# Patient Record
Sex: MALE | Race: ASIAN | NOT HISPANIC OR LATINO | URBAN - METROPOLITAN AREA
[De-identification: names, ages, dates, MRNs, and addresses within clinical notes are randomized per-mention and may not be internally consistent; named-entity substitution may affect disease eponyms.]

---

## 2018-01-01 ENCOUNTER — OUTPATIENT (OUTPATIENT)
Dept: OUTPATIENT SERVICES | Facility: HOSPITAL | Age: 73
LOS: 1 days | End: 2018-01-01

## 2018-01-20 ENCOUNTER — INPATIENT (INPATIENT)
Facility: HOSPITAL | Age: 73
LOS: 3 days | Discharge: HOME HEALTH | End: 2018-01-24
Attending: INTERNAL MEDICINE | Admitting: INTERNAL MEDICINE
Payer: MEDICAID

## 2018-01-20 VITALS
TEMPERATURE: 98 F | RESPIRATION RATE: 16 BRPM | DIASTOLIC BLOOD PRESSURE: 77 MMHG | OXYGEN SATURATION: 100 % | SYSTOLIC BLOOD PRESSURE: 144 MMHG | HEART RATE: 62 BPM

## 2018-01-20 DIAGNOSIS — Z98.49 CATARACT EXTRACTION STATUS, UNSPECIFIED EYE: Chronic | ICD-10-CM

## 2018-01-20 LAB
ALBUMIN SERPL ELPH-MCNC: 4.2 G/DL — SIGNIFICANT CHANGE UP (ref 3.3–5)
ALP SERPL-CCNC: 61 U/L — SIGNIFICANT CHANGE UP (ref 40–120)
ALT FLD-CCNC: 21 U/L — SIGNIFICANT CHANGE UP (ref 4–41)
APTT BLD: 31.3 SEC — SIGNIFICANT CHANGE UP (ref 27.5–37.4)
AST SERPL-CCNC: 31 U/L — SIGNIFICANT CHANGE UP (ref 4–40)
BASE EXCESS BLDV CALC-SCNC: 4.2 MMOL/L — SIGNIFICANT CHANGE UP
BASOPHILS # BLD AUTO: 0.01 K/UL — SIGNIFICANT CHANGE UP (ref 0–0.2)
BASOPHILS NFR BLD AUTO: 0.2 % — SIGNIFICANT CHANGE UP (ref 0–2)
BILIRUB SERPL-MCNC: 0.7 MG/DL — SIGNIFICANT CHANGE UP (ref 0.2–1.2)
BLOOD GAS VENOUS - CREATININE: 1.22 MG/DL — SIGNIFICANT CHANGE UP (ref 0.5–1.3)
BUN SERPL-MCNC: 17 MG/DL — SIGNIFICANT CHANGE UP (ref 7–23)
CALCIUM SERPL-MCNC: 8.5 MG/DL — SIGNIFICANT CHANGE UP (ref 8.4–10.5)
CHLORIDE BLDV-SCNC: 105 MMOL/L — SIGNIFICANT CHANGE UP (ref 96–108)
CHLORIDE SERPL-SCNC: 98 MMOL/L — SIGNIFICANT CHANGE UP (ref 98–107)
CK MB BLD-MCNC: 2.2 — SIGNIFICANT CHANGE UP (ref 0–2.5)
CK MB BLD-MCNC: 3.36 NG/ML — SIGNIFICANT CHANGE UP (ref 1–6.6)
CK SERPL-CCNC: 151 U/L — SIGNIFICANT CHANGE UP (ref 30–200)
CO2 SERPL-SCNC: 25 MMOL/L — SIGNIFICANT CHANGE UP (ref 22–31)
COHGB MFR BLDV: 0.8 % — SIGNIFICANT CHANGE UP (ref 0.5–1.5)
COHGB MFR BLDV: 14.4 G/DL — SIGNIFICANT CHANGE UP (ref 13–17)
CREAT SERPL-MCNC: 1.28 MG/DL — SIGNIFICANT CHANGE UP (ref 0.5–1.3)
EOSINOPHIL # BLD AUTO: 0 K/UL — SIGNIFICANT CHANGE UP (ref 0–0.5)
EOSINOPHIL NFR BLD AUTO: 0 % — SIGNIFICANT CHANGE UP (ref 0–6)
GAS PNL BLDV: 130 MMOL/L — LOW (ref 136–146)
GLUCOSE BLDV-MCNC: 178 — HIGH (ref 70–99)
GLUCOSE SERPL-MCNC: 164 MG/DL — HIGH (ref 70–99)
HCO3 BLDV-SCNC: 26 MMOL/L — SIGNIFICANT CHANGE UP (ref 20–27)
HCT VFR BLD CALC: 43.2 % — SIGNIFICANT CHANGE UP (ref 39–50)
HCT VFR BLDV CALC: 44.1 % — SIGNIFICANT CHANGE UP (ref 39–51)
HGB BLD-MCNC: 14.1 G/DL — SIGNIFICANT CHANGE UP (ref 13–17)
HGB BLDV-MCNC: 14.4 G/DL — SIGNIFICANT CHANGE UP (ref 13–17)
IMM GRANULOCYTES # BLD AUTO: 0.03 # — SIGNIFICANT CHANGE UP
IMM GRANULOCYTES NFR BLD AUTO: 0.5 % — SIGNIFICANT CHANGE UP (ref 0–1.5)
INR BLD: 1.05 — SIGNIFICANT CHANGE UP (ref 0.88–1.17)
LACTATE BLDV-MCNC: 1.6 MMOL/L — SIGNIFICANT CHANGE UP (ref 0.5–2)
LIDOCAIN IGE QN: 33.1 U/L — SIGNIFICANT CHANGE UP (ref 7–60)
LYMPHOCYTES # BLD AUTO: 0.62 K/UL — LOW (ref 1–3.3)
LYMPHOCYTES # BLD AUTO: 10.2 % — LOW (ref 13–44)
MCHC RBC-ENTMCNC: 28.3 PG — SIGNIFICANT CHANGE UP (ref 27–34)
MCHC RBC-ENTMCNC: 32.6 % — SIGNIFICANT CHANGE UP (ref 32–36)
MCV RBC AUTO: 86.6 FL — SIGNIFICANT CHANGE UP (ref 80–100)
METHGB MFR BLDV: 0.6 % — SIGNIFICANT CHANGE UP (ref 0–1.5)
MONOCYTES # BLD AUTO: 0.26 K/UL — SIGNIFICANT CHANGE UP (ref 0–0.9)
MONOCYTES NFR BLD AUTO: 4.3 % — SIGNIFICANT CHANGE UP (ref 2–14)
NEUTROPHILS # BLD AUTO: 5.14 K/UL — SIGNIFICANT CHANGE UP (ref 1.8–7.4)
NEUTROPHILS NFR BLD AUTO: 84.8 % — HIGH (ref 43–77)
NRBC # FLD: 0 — SIGNIFICANT CHANGE UP
OXYHGB MFR BLDV: 30 % — LOW (ref 59–84)
PCO2 BLDV: 53 MMHG — HIGH (ref 41–51)
PH BLDV: 7.36 PH — SIGNIFICANT CHANGE UP (ref 7.32–7.43)
PLATELET # BLD AUTO: 178 K/UL — SIGNIFICANT CHANGE UP (ref 150–400)
PMV BLD: 11.7 FL — SIGNIFICANT CHANGE UP (ref 7–13)
PO2 BLDV: < 24 MMHG — LOW (ref 35–40)
POTASSIUM BLDV-SCNC: 4 MMOL/L — SIGNIFICANT CHANGE UP (ref 3.4–4.5)
POTASSIUM SERPL-MCNC: 4.9 MMOL/L — SIGNIFICANT CHANGE UP (ref 3.5–5.3)
POTASSIUM SERPL-SCNC: 4.9 MMOL/L — SIGNIFICANT CHANGE UP (ref 3.5–5.3)
PROT SERPL-MCNC: 7.3 G/DL — SIGNIFICANT CHANGE UP (ref 6–8.3)
PROTHROM AB SERPL-ACNC: 12.1 SEC — SIGNIFICANT CHANGE UP (ref 9.8–13.1)
RBC # BLD: 4.99 M/UL — SIGNIFICANT CHANGE UP (ref 4.2–5.8)
RBC # FLD: 15.5 % — HIGH (ref 10.3–14.5)
SAO2 % BLDV: 30.4 % — LOW (ref 60–85)
SODIUM SERPL-SCNC: 136 MMOL/L — SIGNIFICANT CHANGE UP (ref 135–145)
TROPONIN T SERPL-MCNC: < 0.06 NG/ML — SIGNIFICANT CHANGE UP (ref 0–0.06)
WBC # BLD: 6.06 K/UL — SIGNIFICANT CHANGE UP (ref 3.8–10.5)
WBC # FLD AUTO: 6.06 K/UL — SIGNIFICANT CHANGE UP (ref 3.8–10.5)

## 2018-01-20 PROCEDURE — 74177 CT ABD & PELVIS W/CONTRAST: CPT | Mod: 26

## 2018-01-20 PROCEDURE — 71046 X-RAY EXAM CHEST 2 VIEWS: CPT | Mod: 26

## 2018-01-20 RX ORDER — ASPIRIN/CALCIUM CARB/MAGNESIUM 324 MG
162 TABLET ORAL ONCE
Qty: 0 | Refills: 0 | Status: COMPLETED | OUTPATIENT
Start: 2018-01-20 | End: 2018-01-20

## 2018-01-20 RX ORDER — ONDANSETRON 8 MG/1
4 TABLET, FILM COATED ORAL ONCE
Qty: 0 | Refills: 0 | Status: COMPLETED | OUTPATIENT
Start: 2018-01-20 | End: 2018-01-20

## 2018-01-20 RX ORDER — SODIUM CHLORIDE 9 MG/ML
500 INJECTION INTRAMUSCULAR; INTRAVENOUS; SUBCUTANEOUS ONCE
Qty: 0 | Refills: 0 | Status: COMPLETED | OUTPATIENT
Start: 2018-01-20 | End: 2018-01-20

## 2018-01-20 RX ADMIN — ONDANSETRON 4 MILLIGRAM(S): 8 TABLET, FILM COATED ORAL at 21:06

## 2018-01-20 RX ADMIN — Medication 162 MILLIGRAM(S): at 21:06

## 2018-01-20 RX ADMIN — SODIUM CHLORIDE 500 MILLILITER(S): 9 INJECTION INTRAMUSCULAR; INTRAVENOUS; SUBCUTANEOUS at 21:06

## 2018-01-20 NOTE — ED ADULT NURSE NOTE - OBJECTIVE STATEMENT
Celestino RN:. 72 Male received in spot A.  Pt is primarily Thai speaking,  translation service offered prefer son to translate.  Pt co SOB beginning this morning.  Son states, that Fire Dept was called for possible carbon monoxide exposure.  As per son there was co2 readings in the house and it was evacuated.  no other residents with symptoms.  Also co nausea, vomiting x7 episodes.  denies blood in emesis.   As per wife increase malaise since this am.  Denies cough but states subjective fevers at home. 18G L AC.

## 2018-01-20 NOTE — ED PROVIDER NOTE - ATTENDING CONTRIBUTION TO CARE
Dr. Cespedes:  I have personally performed a face to face bedside history and physical examination of this patient. I have discussed the history, examination, review of systems, assessment and plan of management with the resident. I have reviewed the electronic medical record and amended it to reflect my history, review of systems, physical exam, assessment and plan.    72M h/o HTN, DM, CVA, presents with nausea/vomiting, malaise, diaphoresis x 1 day.  Of note, son states that their building warned them of possible carbon monoxide exposure this evening.   Denies fever/chills, cp, neuro deficits.  +Mild headache.    Exam:  - nad  - rrr  - ctab  - abd soft, mildly ttp llq  - no focal neuro deficits    A/P  - ddx: carbon monoxide exposure, ACS, pancreatitis, diverticulitis  - cbc, cmp, trop, vbg, co-ox, lipase  - ekg  - cxr  - CT abd/pelvis  - ua

## 2018-01-20 NOTE — ED PROVIDER NOTE - OBJECTIVE STATEMENT
72M h/o CVA, DM, HTN, HLD Occitan speaking presenting with SOB. Notes symptoms starting this morning with nausea, vomiting x7, SOB, malaise, worse with exertion. Son also notes that Fire Dept was called for possible carbon monoxide exposure. Had cataract surgery yesterday on R eye.

## 2018-01-20 NOTE — ED ADULT NURSE NOTE - CHIEF COMPLAINT QUOTE
brought in by EMS from home for c/o nausea/vomiting with diaphoresis and SOB that started 9am. JY=233. Hx DM, HTN, CVA, HLD

## 2018-01-20 NOTE — ED ADULT TRIAGE NOTE - CHIEF COMPLAINT QUOTE
brought in by EMS from home for c/o nausea/vomiting with diaphoresis and SOB that started 9am. YP=[id number]. Hx DM, HTN, CVA, HLD

## 2018-01-20 NOTE — ED PROVIDER NOTE - MEDICAL DECISION MAKING DETAILS
72M p/w above history p/w nausea, vomiting, shortness of breath, LLQ tenderness on exam, concerning for r/o ACS vs diverticulitis vs UTI/pyelo  -labs, ekg, xr, CT

## 2018-01-21 DIAGNOSIS — I10 ESSENTIAL (PRIMARY) HYPERTENSION: ICD-10-CM

## 2018-01-21 DIAGNOSIS — H26.9 UNSPECIFIED CATARACT: ICD-10-CM

## 2018-01-21 DIAGNOSIS — J45.909 UNSPECIFIED ASTHMA, UNCOMPLICATED: ICD-10-CM

## 2018-01-21 DIAGNOSIS — E11.9 TYPE 2 DIABETES MELLITUS WITHOUT COMPLICATIONS: ICD-10-CM

## 2018-01-21 DIAGNOSIS — R06.02 SHORTNESS OF BREATH: ICD-10-CM

## 2018-01-21 DIAGNOSIS — Z29.9 ENCOUNTER FOR PROPHYLACTIC MEASURES, UNSPECIFIED: ICD-10-CM

## 2018-01-21 DIAGNOSIS — N40.0 BENIGN PROSTATIC HYPERPLASIA WITHOUT LOWER URINARY TRACT SYMPTOMS: ICD-10-CM

## 2018-01-21 DIAGNOSIS — E03.9 HYPOTHYROIDISM, UNSPECIFIED: ICD-10-CM

## 2018-01-21 DIAGNOSIS — R11.2 NAUSEA WITH VOMITING, UNSPECIFIED: ICD-10-CM

## 2018-01-21 DIAGNOSIS — K21.9 GASTRO-ESOPHAGEAL REFLUX DISEASE WITHOUT ESOPHAGITIS: ICD-10-CM

## 2018-01-21 DIAGNOSIS — E78.5 HYPERLIPIDEMIA, UNSPECIFIED: ICD-10-CM

## 2018-01-21 LAB
APPEARANCE UR: CLEAR — SIGNIFICANT CHANGE UP
BILIRUB UR-MCNC: NEGATIVE — SIGNIFICANT CHANGE UP
BLOOD UR QL VISUAL: NEGATIVE — SIGNIFICANT CHANGE UP
BUN SERPL-MCNC: 12 MG/DL — SIGNIFICANT CHANGE UP (ref 7–23)
CALCIUM SERPL-MCNC: 8.8 MG/DL — SIGNIFICANT CHANGE UP (ref 8.4–10.5)
CHLORIDE SERPL-SCNC: 101 MMOL/L — SIGNIFICANT CHANGE UP (ref 98–107)
CHOLEST SERPL-MCNC: 117 MG/DL — LOW (ref 120–199)
CK MB BLD-MCNC: 3.2 — HIGH (ref 0–2.5)
CK MB BLD-MCNC: 5.21 NG/ML — SIGNIFICANT CHANGE UP (ref 1–6.6)
CK MB BLD-MCNC: 5.54 NG/ML — SIGNIFICANT CHANGE UP (ref 1–6.6)
CK SERPL-CCNC: 135 U/L — SIGNIFICANT CHANGE UP (ref 30–200)
CK SERPL-CCNC: 161 U/L — SIGNIFICANT CHANGE UP (ref 30–200)
CO2 SERPL-SCNC: 25 MMOL/L — SIGNIFICANT CHANGE UP (ref 22–31)
COLOR SPEC: SIGNIFICANT CHANGE UP
CREAT SERPL-MCNC: 1.23 MG/DL — SIGNIFICANT CHANGE UP (ref 0.5–1.3)
GLUCOSE SERPL-MCNC: 95 MG/DL — SIGNIFICANT CHANGE UP (ref 70–99)
GLUCOSE UR-MCNC: NEGATIVE — SIGNIFICANT CHANGE UP
HBA1C BLD-MCNC: 6.9 % — HIGH (ref 4–5.6)
HCT VFR BLD CALC: 41.2 % — SIGNIFICANT CHANGE UP (ref 39–50)
HDLC SERPL-MCNC: 65 MG/DL — HIGH (ref 35–55)
HGB BLD-MCNC: 13.6 G/DL — SIGNIFICANT CHANGE UP (ref 13–17)
KETONES UR-MCNC: NEGATIVE — SIGNIFICANT CHANGE UP
LEUKOCYTE ESTERASE UR-ACNC: NEGATIVE — SIGNIFICANT CHANGE UP
LIPID PNL WITH DIRECT LDL SERPL: 46 MG/DL — SIGNIFICANT CHANGE UP
MAGNESIUM SERPL-MCNC: 2 MG/DL — SIGNIFICANT CHANGE UP (ref 1.6–2.6)
MCHC RBC-ENTMCNC: 28.3 PG — SIGNIFICANT CHANGE UP (ref 27–34)
MCHC RBC-ENTMCNC: 33 % — SIGNIFICANT CHANGE UP (ref 32–36)
MCV RBC AUTO: 85.8 FL — SIGNIFICANT CHANGE UP (ref 80–100)
MUCOUS THREADS # UR AUTO: SIGNIFICANT CHANGE UP
NITRITE UR-MCNC: NEGATIVE — SIGNIFICANT CHANGE UP
NRBC # FLD: 0 — SIGNIFICANT CHANGE UP
NT-PROBNP SERPL-SCNC: 289.8 PG/ML — SIGNIFICANT CHANGE UP
PH UR: 6 — SIGNIFICANT CHANGE UP (ref 4.6–8)
PLATELET # BLD AUTO: 174 K/UL — SIGNIFICANT CHANGE UP (ref 150–400)
PMV BLD: 11.5 FL — SIGNIFICANT CHANGE UP (ref 7–13)
POTASSIUM SERPL-MCNC: 4 MMOL/L — SIGNIFICANT CHANGE UP (ref 3.5–5.3)
POTASSIUM SERPL-SCNC: 4 MMOL/L — SIGNIFICANT CHANGE UP (ref 3.5–5.3)
PROT UR-MCNC: NEGATIVE MG/DL — SIGNIFICANT CHANGE UP
RBC # BLD: 4.8 M/UL — SIGNIFICANT CHANGE UP (ref 4.2–5.8)
RBC # FLD: 15.2 % — HIGH (ref 10.3–14.5)
RBC CASTS # UR COMP ASSIST: SIGNIFICANT CHANGE UP (ref 0–?)
SODIUM SERPL-SCNC: 138 MMOL/L — SIGNIFICANT CHANGE UP (ref 135–145)
SP GR SPEC: 1.01 — SIGNIFICANT CHANGE UP (ref 1–1.04)
SQUAMOUS # UR AUTO: SIGNIFICANT CHANGE UP
TRIGL SERPL-MCNC: 48 MG/DL — SIGNIFICANT CHANGE UP (ref 10–149)
TROPONIN T SERPL-MCNC: < 0.06 NG/ML — SIGNIFICANT CHANGE UP (ref 0–0.06)
TROPONIN T SERPL-MCNC: < 0.06 NG/ML — SIGNIFICANT CHANGE UP (ref 0–0.06)
TSH SERPL-MCNC: 2.81 UIU/ML — SIGNIFICANT CHANGE UP (ref 0.27–4.2)
UROBILINOGEN FLD QL: NORMAL MG/DL — SIGNIFICANT CHANGE UP
WBC # BLD: 4.89 K/UL — SIGNIFICANT CHANGE UP (ref 3.8–10.5)
WBC # FLD AUTO: 4.89 K/UL — SIGNIFICANT CHANGE UP (ref 3.8–10.5)
WBC UR QL: SIGNIFICANT CHANGE UP (ref 0–?)

## 2018-01-21 PROCEDURE — 93010 ELECTROCARDIOGRAM REPORT: CPT

## 2018-01-21 RX ORDER — ACETAMINOPHEN 500 MG
650 TABLET ORAL EVERY 6 HOURS
Qty: 0 | Refills: 0 | Status: DISCONTINUED | OUTPATIENT
Start: 2018-01-21 | End: 2018-01-21

## 2018-01-21 RX ORDER — KETOROLAC TROMETHAMINE 0.5 %
1 DROPS OPHTHALMIC (EYE)
Qty: 0 | Refills: 0 | COMMUNITY

## 2018-01-21 RX ORDER — BUDESONIDE AND FORMOTEROL FUMARATE DIHYDRATE 160; 4.5 UG/1; UG/1
2 AEROSOL RESPIRATORY (INHALATION)
Qty: 0 | Refills: 0 | Status: DISCONTINUED | OUTPATIENT
Start: 2018-01-21 | End: 2018-01-24

## 2018-01-21 RX ORDER — HEPARIN SODIUM 5000 [USP'U]/ML
5000 INJECTION INTRAVENOUS; SUBCUTANEOUS EVERY 12 HOURS
Qty: 0 | Refills: 0 | Status: DISCONTINUED | OUTPATIENT
Start: 2018-01-21 | End: 2018-01-24

## 2018-01-21 RX ORDER — TAMSULOSIN HYDROCHLORIDE 0.4 MG/1
0.4 CAPSULE ORAL AT BEDTIME
Qty: 0 | Refills: 0 | Status: DISCONTINUED | OUTPATIENT
Start: 2018-01-21 | End: 2018-01-24

## 2018-01-21 RX ORDER — ONDANSETRON 8 MG/1
4 TABLET, FILM COATED ORAL ONCE
Qty: 0 | Refills: 0 | Status: COMPLETED | OUTPATIENT
Start: 2018-01-21 | End: 2018-01-21

## 2018-01-21 RX ORDER — DEXTROSE 50 % IN WATER 50 %
12.5 SYRINGE (ML) INTRAVENOUS ONCE
Qty: 0 | Refills: 0 | Status: DISCONTINUED | OUTPATIENT
Start: 2018-01-21 | End: 2018-01-24

## 2018-01-21 RX ORDER — PIOGLITAZONE HYDROCHLORIDE 15 MG/1
1 TABLET ORAL
Qty: 0 | Refills: 0 | COMMUNITY

## 2018-01-21 RX ORDER — BUDESONIDE AND FORMOTEROL FUMARATE DIHYDRATE 160; 4.5 UG/1; UG/1
2 AEROSOL RESPIRATORY (INHALATION)
Qty: 0 | Refills: 0 | COMMUNITY

## 2018-01-21 RX ORDER — FOLIC ACID 0.8 MG
1 TABLET ORAL DAILY
Qty: 0 | Refills: 0 | Status: DISCONTINUED | OUTPATIENT
Start: 2018-01-21 | End: 2018-01-24

## 2018-01-21 RX ORDER — TAMSULOSIN HYDROCHLORIDE 0.4 MG/1
1 CAPSULE ORAL
Qty: 0 | Refills: 0 | COMMUNITY

## 2018-01-21 RX ORDER — ALBUTEROL 90 UG/1
2 AEROSOL, METERED ORAL EVERY 6 HOURS
Qty: 0 | Refills: 0 | Status: DISCONTINUED | OUTPATIENT
Start: 2018-01-21 | End: 2018-01-24

## 2018-01-21 RX ORDER — AMLODIPINE BESYLATE 2.5 MG/1
2.5 TABLET ORAL DAILY
Qty: 0 | Refills: 0 | Status: DISCONTINUED | OUTPATIENT
Start: 2018-01-21 | End: 2018-01-24

## 2018-01-21 RX ORDER — LOSARTAN POTASSIUM 100 MG/1
1 TABLET, FILM COATED ORAL
Qty: 0 | Refills: 0 | COMMUNITY

## 2018-01-21 RX ORDER — ACETAMINOPHEN 500 MG
650 TABLET ORAL EVERY 6 HOURS
Qty: 0 | Refills: 0 | Status: DISCONTINUED | OUTPATIENT
Start: 2018-01-21 | End: 2018-01-24

## 2018-01-21 RX ORDER — PANTOPRAZOLE SODIUM 20 MG/1
40 TABLET, DELAYED RELEASE ORAL
Qty: 0 | Refills: 0 | Status: DISCONTINUED | OUTPATIENT
Start: 2018-01-21 | End: 2018-01-24

## 2018-01-21 RX ORDER — GLUCAGON INJECTION, SOLUTION 0.5 MG/.1ML
1 INJECTION, SOLUTION SUBCUTANEOUS ONCE
Qty: 0 | Refills: 0 | Status: DISCONTINUED | OUTPATIENT
Start: 2018-01-21 | End: 2018-01-24

## 2018-01-21 RX ORDER — KETOROLAC TROMETHAMINE 0.5 %
1 DROPS OPHTHALMIC (EYE) DAILY
Qty: 0 | Refills: 0 | Status: DISCONTINUED | OUTPATIENT
Start: 2018-01-21 | End: 2018-01-24

## 2018-01-21 RX ORDER — PANTOPRAZOLE SODIUM 20 MG/1
1 TABLET, DELAYED RELEASE ORAL
Qty: 0 | Refills: 0 | COMMUNITY

## 2018-01-21 RX ORDER — DONEPEZIL HYDROCHLORIDE 10 MG/1
1 TABLET, FILM COATED ORAL
Qty: 0 | Refills: 0 | COMMUNITY

## 2018-01-21 RX ORDER — ATORVASTATIN CALCIUM 80 MG/1
40 TABLET, FILM COATED ORAL AT BEDTIME
Qty: 0 | Refills: 0 | Status: DISCONTINUED | OUTPATIENT
Start: 2018-01-21 | End: 2018-01-24

## 2018-01-21 RX ORDER — INSULIN LISPRO 100/ML
VIAL (ML) SUBCUTANEOUS AT BEDTIME
Qty: 0 | Refills: 0 | Status: DISCONTINUED | OUTPATIENT
Start: 2018-01-21 | End: 2018-01-24

## 2018-01-21 RX ORDER — PREDNISOLONE SODIUM PHOSPHATE 1 %
1 DROPS OPHTHALMIC (EYE)
Qty: 0 | Refills: 0 | COMMUNITY

## 2018-01-21 RX ORDER — AMLODIPINE BESYLATE 2.5 MG/1
1 TABLET ORAL
Qty: 0 | Refills: 0 | COMMUNITY

## 2018-01-21 RX ORDER — FAMOTIDINE 10 MG/ML
20 INJECTION INTRAVENOUS ONCE
Qty: 0 | Refills: 0 | Status: COMPLETED | OUTPATIENT
Start: 2018-01-21 | End: 2018-01-21

## 2018-01-21 RX ORDER — DONEPEZIL HYDROCHLORIDE 10 MG/1
10 TABLET, FILM COATED ORAL AT BEDTIME
Qty: 0 | Refills: 0 | Status: DISCONTINUED | OUTPATIENT
Start: 2018-01-21 | End: 2018-01-24

## 2018-01-21 RX ORDER — INSULIN LISPRO 100/ML
VIAL (ML) SUBCUTANEOUS
Qty: 0 | Refills: 0 | Status: DISCONTINUED | OUTPATIENT
Start: 2018-01-21 | End: 2018-01-24

## 2018-01-21 RX ORDER — ALBUTEROL 90 UG/1
2 AEROSOL, METERED ORAL
Qty: 0 | Refills: 0 | COMMUNITY

## 2018-01-21 RX ORDER — FOLIC ACID 0.8 MG
1 TABLET ORAL
Qty: 0 | Refills: 0 | COMMUNITY

## 2018-01-21 RX ORDER — LOSARTAN POTASSIUM 100 MG/1
50 TABLET, FILM COATED ORAL DAILY
Qty: 0 | Refills: 0 | Status: DISCONTINUED | OUTPATIENT
Start: 2018-01-21 | End: 2018-01-24

## 2018-01-21 RX ORDER — PANTOPRAZOLE SODIUM 20 MG/1
40 TABLET, DELAYED RELEASE ORAL
Qty: 0 | Refills: 0 | Status: DISCONTINUED | OUTPATIENT
Start: 2018-01-21 | End: 2018-01-21

## 2018-01-21 RX ORDER — ASPIRIN/CALCIUM CARB/MAGNESIUM 324 MG
81 TABLET ORAL DAILY
Qty: 0 | Refills: 0 | Status: DISCONTINUED | OUTPATIENT
Start: 2018-01-21 | End: 2018-01-24

## 2018-01-21 RX ORDER — LEVOTHYROXINE SODIUM 125 MCG
1 TABLET ORAL
Qty: 0 | Refills: 0 | COMMUNITY

## 2018-01-21 RX ORDER — DEXTROSE 50 % IN WATER 50 %
25 SYRINGE (ML) INTRAVENOUS ONCE
Qty: 0 | Refills: 0 | Status: DISCONTINUED | OUTPATIENT
Start: 2018-01-21 | End: 2018-01-24

## 2018-01-21 RX ORDER — SODIUM CHLORIDE 9 MG/ML
1000 INJECTION, SOLUTION INTRAVENOUS
Qty: 0 | Refills: 0 | Status: DISCONTINUED | OUTPATIENT
Start: 2018-01-21 | End: 2018-01-24

## 2018-01-21 RX ORDER — PREDNISOLONE SODIUM PHOSPHATE 1 %
1 DROPS OPHTHALMIC (EYE) DAILY
Qty: 0 | Refills: 0 | Status: DISCONTINUED | OUTPATIENT
Start: 2018-01-21 | End: 2018-01-24

## 2018-01-21 RX ORDER — SIMETHICONE 80 MG/1
80 TABLET, CHEWABLE ORAL
Qty: 0 | Refills: 0 | Status: DISCONTINUED | OUTPATIENT
Start: 2018-01-21 | End: 2018-01-23

## 2018-01-21 RX ORDER — DEXTROSE 50 % IN WATER 50 %
1 SYRINGE (ML) INTRAVENOUS ONCE
Qty: 0 | Refills: 0 | Status: DISCONTINUED | OUTPATIENT
Start: 2018-01-21 | End: 2018-01-24

## 2018-01-21 RX ORDER — LEVOTHYROXINE SODIUM 125 MCG
25 TABLET ORAL DAILY
Qty: 0 | Refills: 0 | Status: DISCONTINUED | OUTPATIENT
Start: 2018-01-21 | End: 2018-01-24

## 2018-01-21 RX ADMIN — Medication 1 DROP(S): at 13:04

## 2018-01-21 RX ADMIN — PANTOPRAZOLE SODIUM 40 MILLIGRAM(S): 20 TABLET, DELAYED RELEASE ORAL at 16:39

## 2018-01-21 RX ADMIN — ONDANSETRON 4 MILLIGRAM(S): 8 TABLET, FILM COATED ORAL at 06:36

## 2018-01-21 RX ADMIN — ATORVASTATIN CALCIUM 40 MILLIGRAM(S): 80 TABLET, FILM COATED ORAL at 21:45

## 2018-01-21 RX ADMIN — FAMOTIDINE 20 MILLIGRAM(S): 10 INJECTION INTRAVENOUS at 14:38

## 2018-01-21 RX ADMIN — HEPARIN SODIUM 5000 UNIT(S): 5000 INJECTION INTRAVENOUS; SUBCUTANEOUS at 06:36

## 2018-01-21 RX ADMIN — Medication 1 MILLIGRAM(S): at 12:22

## 2018-01-21 RX ADMIN — TAMSULOSIN HYDROCHLORIDE 0.4 MILLIGRAM(S): 0.4 CAPSULE ORAL at 21:45

## 2018-01-21 RX ADMIN — BUDESONIDE AND FORMOTEROL FUMARATE DIHYDRATE 2 PUFF(S): 160; 4.5 AEROSOL RESPIRATORY (INHALATION) at 21:45

## 2018-01-21 RX ADMIN — DONEPEZIL HYDROCHLORIDE 10 MILLIGRAM(S): 10 TABLET, FILM COATED ORAL at 21:45

## 2018-01-21 RX ADMIN — LOSARTAN POTASSIUM 50 MILLIGRAM(S): 100 TABLET, FILM COATED ORAL at 06:36

## 2018-01-21 RX ADMIN — Medication 81 MILLIGRAM(S): at 12:30

## 2018-01-21 RX ADMIN — BUDESONIDE AND FORMOTEROL FUMARATE DIHYDRATE 2 PUFF(S): 160; 4.5 AEROSOL RESPIRATORY (INHALATION) at 08:21

## 2018-01-21 RX ADMIN — Medication 650 MILLIGRAM(S): at 10:02

## 2018-01-21 RX ADMIN — Medication 650 MILLIGRAM(S): at 10:52

## 2018-01-21 RX ADMIN — PANTOPRAZOLE SODIUM 40 MILLIGRAM(S): 20 TABLET, DELAYED RELEASE ORAL at 06:36

## 2018-01-21 RX ADMIN — SIMETHICONE 80 MILLIGRAM(S): 80 TABLET, CHEWABLE ORAL at 19:38

## 2018-01-21 RX ADMIN — AMLODIPINE BESYLATE 2.5 MILLIGRAM(S): 2.5 TABLET ORAL at 06:36

## 2018-01-21 RX ADMIN — Medication 25 MICROGRAM(S): at 06:36

## 2018-01-21 RX ADMIN — HEPARIN SODIUM 5000 UNIT(S): 5000 INJECTION INTRAVENOUS; SUBCUTANEOUS at 17:18

## 2018-01-21 NOTE — DIETITIAN INITIAL EVALUATION ADULT. - NS AS NUTRI INTERV MEALS SNACK
1. Suggest: PO diet rx: Regular, Consistent Carbohydrate (no snacks), DASH/TLC (cholesterol and sodium restricted), Halal; Fluid restriction per MD discretion;          2. Encourage & assist Pt with meals; Monitor PO diet tolerance; Honor food preferences;            3. Monitor labs, weights, hydration status;/Diets modified for specific foods and ingredients/Other (specify)

## 2018-01-21 NOTE — DIETITIAN INITIAL EVALUATION ADULT. - FACTORS AFF FOOD INTAKE
Denominational/ethnic/cultural/personal food preferences PTA Pt was having Nausea/vomiting; Pt C/O mild nauseousness @ time of visit

## 2018-01-21 NOTE — PHYSICAL THERAPY INITIAL EVALUATION ADULT - PERTINENT HX OF CURRENT PROBLEM, REHAB EVAL
71 y/o M with PMH of CVA, DM, HTN, HLD, BPH, Cataract, Hypothyroidism, GERD presented with SOB, nausea and vomiting. R/o ACS

## 2018-01-21 NOTE — PHYSICAL THERAPY INITIAL EVALUATION ADULT - DIAGNOSIS, PT EVAL
Pt admitted for CHF; pt presents with decreased strength, decreased balance, and decreased aerobic capacity/endurance

## 2018-01-21 NOTE — DIETITIAN INITIAL EVALUATION ADULT. - OTHER INFO
Nutrition Consult  X Registered dietitian Nutrition Consult X Registered Dietitian. Pt 73 yo male appears alert, oriented. Pt speaks Mongolian. This RDN speaks Mongolian as well. Per Pt his appetite varies, some days good, some days not. No chew/swallow problem voiced. PTA Pt was vomiting, Pt C/O mild nauseousness @ time of visit. No C/O diarrhea/constipation @ present. Per Pt his wife usually prepares food (low in salt) for him. Pt also stated he tries to avoid sugar/honey at home. Per Pt his UBW: ~196#; Pt lost weight: ~10# in past ~1 year. Food preferences discussed. Of note Pt's HbA1c level 6.9% (1/21). Better food choices discussed; RDN answered questions/concerns related to diet as well. RDN remains available, Pt made aware.

## 2018-01-21 NOTE — H&P ADULT - NEGATIVE NEUROLOGICAL SYMPTOMS
no focal seizures/no hemiparesis/no loss of sensation/no difficulty walking/no loss of consciousness/no headache/no confusion/no vertigo/no weakness/no generalized seizures/no paresthesias/no transient paralysis/no syncope/no tremors

## 2018-01-21 NOTE — H&P ADULT - HISTORY OF PRESENT ILLNESS
71 y/o M with PMH of CVA, DM, HTN, HLD, BPH, Cataract, Hypothyroidism, GERD presented with SOB, nausea and vomiting. As per the patient he developed nausea/vomiting about 10 hours ago after he had eaten some rice. Patient stated that he had multiple episodes of NBNB vomiting with associated nausea. Patient also endorsed JAY and is able to walk only few feet to catch his breath. Patient also endorsed non-productive cough for the past few days. Patient stated that due to the vomiting he has not been eating much. Patient denied any abdominal pain and when he came to the ER his nausea and vomiting had resolved. Patient denied any CP, fevers, chills, diarrhea, constipation, abdominal pain, dysuria, melena, hematochezia, recent travel, sick contact, pleuritic or positional chest pain. As per ED provider note, "Son also noted that fire department was called for possible carbon monoxide exposure".     On ED admission EKG revealed Sinus bradycardia at a rate of 59, with incomplete RBBB, with TWI in lead V2-V6, I, II, III, AVF with Qtc of 469, CE x 1: Negative, Gluc: 164. CT abd/pelvis: Mild colonic diverticulosis without diverticulitis. No bowel obstruction or evidence of acute inflammation. CXR: Trace right pleural effusion. Patient received 1x dose of Aspirin 162mg in the ED. When examined patient is resting in the stretcher and denied any current nausea or vomiting. 73 y/o M with PMH of CVA, DM, HTN, HLD, BPH, Cataract, Hypothyroidism, GERD presented with SOB, nausea and vomiting. As per the patient he developed nausea/vomiting about 10 hours ago after he had eaten some rice. Patient stated that he had multiple episodes of NBNB vomiting with associated nausea. Patient also endorsed JAY and is able to walk only few feet and has to stop multiple times to catch his breath. Patient also endorsed non-productive cough for the past few days. Patient stated that due to the vomiting he has not been eating much. Patient denied any abdominal pain and when he came to the ER his nausea and vomiting had resolved. Patient denied any CP, fevers, chills, diarrhea, constipation, abdominal pain, dysuria, melena, hematochezia, recent travel, sick contact, pleuritic or positional chest pain. As per ED provider note, "Son also noted that fire department was called for possible carbon monoxide exposure".     On ED admission EKG revealed Sinus bradycardia at a rate of 59, with incomplete RBBB, with TWI in lead V2-V6, I, II, III, AVF with Qtc of 469, CE x 1: Negative, Gluc: 164. CT abd/pelvis: Mild colonic diverticulosis without diverticulitis. No bowel obstruction or evidence of acute inflammation. CXR: Trace right pleural effusion. Patient received 1x dose of Aspirin 162mg in the ED. When examined patient is resting in the stretcher and denied any current nausea or vomiting.

## 2018-01-21 NOTE — DIETITIAN INITIAL EVALUATION ADULT. - DIET TYPE
regular/low sodium/DASH/TLC (sodium and cholesterol restricted diet)/consistent carbohydrate (no snacks)/Halal/No Chocolate, No carbonated Beverages/1000ml

## 2018-01-21 NOTE — H&P ADULT - ASSESSMENT
73 y/o M with PMH of CVA, DM, HTN, HLD, BPH, Cataract, Hypothyroidism, GERD presented with SOB, nausea and vomiting. R/o ACS

## 2018-01-21 NOTE — PROVIDER CONTACT NOTE (OTHER) - BACKGROUND
(Admit Diagnosis) Shortness of breath  (PMH) GERD (gastroesophageal reflux disease)  (PMH) Hypothyroidism  (PMH) Cataract

## 2018-01-21 NOTE — H&P ADULT - GASTROINTESTINAL DETAILS
nontender/no guarding/normal/no rebound tenderness/bowel sounds normal/no distention/soft/no rigidity

## 2018-01-21 NOTE — H&P ADULT - NEGATIVE MUSCULOSKELETAL SYMPTOMS
no muscle weakness/no arthralgia/no myalgia/no neck pain/no arthritis/no joint swelling/no muscle cramps/no stiffness

## 2018-01-21 NOTE — H&P ADULT - PMH
Asthma    BPH (benign prostatic hyperplasia)    Cataract    CVA (cerebral vascular accident)    Diabetes    GERD (gastroesophageal reflux disease)    Hypothyroidism

## 2018-01-21 NOTE — PHYSICAL THERAPY INITIAL EVALUATION ADULT - PATIENT PROFILE REVIEW, REHAB EVAL
ACTIVITY: OOB with Assistance; spoke with Bernardino Decker prior to PT evaluation--> Pt OK for PT consult/yes

## 2018-01-21 NOTE — H&P ADULT - RS GEN PE MLT RESP DETAILS PC
no intercostal retractions/no wheezes/no chest wall tenderness/respirations non-labored/normal/airway patent/rales/no rhonchi

## 2018-01-21 NOTE — CHART NOTE - NSCHARTNOTEFT_GEN_A_CORE
Called by RN pt reported chest tightness. saw pt at bedside with  phone. Pt expressed chest tightness and gas. Feels the gas is pressing on the chest so slightly short of breath. VSS. physical S1S2 no murmur no gallops. Lung mild bibasilar crackle. no pitting edema. EKG at baseline with TWI at anterolateral leads. no dynamic change. will send trop. start simethicone. will continue to monitor

## 2018-01-21 NOTE — H&P ADULT - PROBLEM SELECTOR PLAN 1
CT abd/pelvis negative for any acute pathology(just showed diverticulosis) likely viral gastroenteritis vs atypical presentation of ACS  Will monitor on telemetry, serial EKG, serial Trupti prn for any episode of chest pain and SOB. EKG from this admission showed deep TWI in multiple leads(similar to prior EKGs)  HgbA1C, TSH, lipid profile, CBC, CMP in am   Consider ischemic workup with NST this admission  TTE ordered   Started on Aspirin 81mg daily

## 2018-01-21 NOTE — DIETITIAN INITIAL EVALUATION ADULT. - SOURCE
patient/family/significant other/other (specify)/Pt's son @ bed side. medical chart family/significant other/other (specify)/Pt's son @ bed side, Medical Chart/patient

## 2018-01-21 NOTE — H&P ADULT - NEGATIVE ENMT SYMPTOMS
no ear pain/no nasal discharge/no hearing difficulty/no tinnitus/no sinus symptoms/no vertigo/no nasal congestion

## 2018-01-21 NOTE — H&P ADULT - ATTENDING COMMENTS
Patient seen and examined.  Agree with above pa note.  pt admitted with nausea, abd discomfort  Patient denies any chest pain, dyspnea, palpitations, cough, syncope, edema, exertional symptoms, fever, chills,  or rash.   symptoms improved  ecg abnl with diffuse t wave abnl    cesnegative  ct abd normal     Vitals stable  nad aao x 3  nc/at neck supple no jvd  cv s1s2 rrr lungs clear   abd soft, nt, nl bs  ext no edema    A/P    Abd pain  nausea  r/o ischemic heart disease    pepcid, protonix  echo/stress shay to r/o ischemic heart disease  med/gi eval for gi symptoms

## 2018-01-21 NOTE — PHYSICAL THERAPY INITIAL EVALUATION ADULT - ADDITIONAL COMMENTS
Pt reports that he lives in an apartment with wife, son, and son's family with no stairs to negotiate as there is an elevator inside. Prior to hospital admission pt was completely independent and used a single axis cane with ambulation. Pt also has rolling walker @ home if he needs and denies any recent falls.    Pt left comfortable in bed, NAD, all lines intact, all precautions maintained, with call bell in reach, son @bedside, and RN aware of PT evaluation.

## 2018-01-21 NOTE — H&P ADULT - NEGATIVE OPHTHALMOLOGIC SYMPTOMS
no blurred vision L/no discharge L/no discharge R/no blurred vision R/no photophobia/no diplopia/no lacrimation L/no lacrimation R

## 2018-01-21 NOTE — H&P ADULT - NSHPLABSRESULTS_GEN_ALL_CORE
14.1   6.06  )-----------( 178      ( 20 Jan 2018 20:39 )             43.2     01-20    136  |  98  |  17  ----------------------------<  164<H>  4.9   |  25  |  1.28    Ca    8.5      20 Jan 2018 20:39    TPro  7.3  /  Alb  4.2  /  TBili  0.7  /  DBili  x   /  AST  31  /  ALT  21  /  AlkPhos  61  01-20    CARDIAC MARKERS ( 20 Jan 2018 20:39 )  x     / < 0.06 ng/mL / 151 u/L / 3.36 ng/mL / x        CT abd/pelvis: Mild colonic diverticulosis without diverticulitis. No bowel obstruction or evidence of acute inflammation.    CXR: Trace right pleural effusion.    EKG: Sinus bradycardia at a rate of 59, with incomplete RBBB, with TWI in lead V2-V6, I, II, III, AVF with Qtc of 469

## 2018-01-22 LAB
BUN SERPL-MCNC: 19 MG/DL — SIGNIFICANT CHANGE UP (ref 7–23)
CALCIUM SERPL-MCNC: 8.3 MG/DL — LOW (ref 8.4–10.5)
CHLORIDE SERPL-SCNC: 101 MMOL/L — SIGNIFICANT CHANGE UP (ref 98–107)
CO2 SERPL-SCNC: 26 MMOL/L — SIGNIFICANT CHANGE UP (ref 22–31)
CREAT SERPL-MCNC: 1.51 MG/DL — HIGH (ref 0.5–1.3)
GLUCOSE SERPL-MCNC: 111 MG/DL — HIGH (ref 70–99)
HCT VFR BLD CALC: 41.9 % — SIGNIFICANT CHANGE UP (ref 39–50)
HGB BLD-MCNC: 13.5 G/DL — SIGNIFICANT CHANGE UP (ref 13–17)
MAGNESIUM SERPL-MCNC: 2.2 MG/DL — SIGNIFICANT CHANGE UP (ref 1.6–2.6)
MCHC RBC-ENTMCNC: 28.1 PG — SIGNIFICANT CHANGE UP (ref 27–34)
MCHC RBC-ENTMCNC: 32.2 % — SIGNIFICANT CHANGE UP (ref 32–36)
MCV RBC AUTO: 87.1 FL — SIGNIFICANT CHANGE UP (ref 80–100)
NRBC # FLD: 0 — SIGNIFICANT CHANGE UP
PLATELET # BLD AUTO: 183 K/UL — SIGNIFICANT CHANGE UP (ref 150–400)
PMV BLD: 11 FL — SIGNIFICANT CHANGE UP (ref 7–13)
POTASSIUM SERPL-MCNC: 4 MMOL/L — SIGNIFICANT CHANGE UP (ref 3.5–5.3)
POTASSIUM SERPL-SCNC: 4 MMOL/L — SIGNIFICANT CHANGE UP (ref 3.5–5.3)
RBC # BLD: 4.81 M/UL — SIGNIFICANT CHANGE UP (ref 4.2–5.8)
RBC # FLD: 15.7 % — HIGH (ref 10.3–14.5)
SODIUM SERPL-SCNC: 137 MMOL/L — SIGNIFICANT CHANGE UP (ref 135–145)
WBC # BLD: 4.37 K/UL — SIGNIFICANT CHANGE UP (ref 3.8–10.5)
WBC # FLD AUTO: 4.37 K/UL — SIGNIFICANT CHANGE UP (ref 3.8–10.5)

## 2018-01-22 PROCEDURE — 93306 TTE W/DOPPLER COMPLETE: CPT | Mod: 26

## 2018-01-22 RX ADMIN — PANTOPRAZOLE SODIUM 40 MILLIGRAM(S): 20 TABLET, DELAYED RELEASE ORAL at 05:29

## 2018-01-22 RX ADMIN — Medication 1: at 17:45

## 2018-01-22 RX ADMIN — BUDESONIDE AND FORMOTEROL FUMARATE DIHYDRATE 2 PUFF(S): 160; 4.5 AEROSOL RESPIRATORY (INHALATION) at 21:31

## 2018-01-22 RX ADMIN — Medication 25 MICROGRAM(S): at 05:28

## 2018-01-22 RX ADMIN — LOSARTAN POTASSIUM 50 MILLIGRAM(S): 100 TABLET, FILM COATED ORAL at 05:29

## 2018-01-22 RX ADMIN — Medication 1 DROP(S): at 11:03

## 2018-01-22 RX ADMIN — BUDESONIDE AND FORMOTEROL FUMARATE DIHYDRATE 2 PUFF(S): 160; 4.5 AEROSOL RESPIRATORY (INHALATION) at 11:04

## 2018-01-22 RX ADMIN — ATORVASTATIN CALCIUM 40 MILLIGRAM(S): 80 TABLET, FILM COATED ORAL at 21:31

## 2018-01-22 RX ADMIN — TAMSULOSIN HYDROCHLORIDE 0.4 MILLIGRAM(S): 0.4 CAPSULE ORAL at 21:31

## 2018-01-22 RX ADMIN — HEPARIN SODIUM 5000 UNIT(S): 5000 INJECTION INTRAVENOUS; SUBCUTANEOUS at 17:40

## 2018-01-22 RX ADMIN — AMLODIPINE BESYLATE 2.5 MILLIGRAM(S): 2.5 TABLET ORAL at 05:28

## 2018-01-22 RX ADMIN — Medication 1 DROP(S): at 11:04

## 2018-01-22 RX ADMIN — Medication 81 MILLIGRAM(S): at 11:03

## 2018-01-22 RX ADMIN — Medication 1 MILLIGRAM(S): at 11:03

## 2018-01-22 RX ADMIN — HEPARIN SODIUM 5000 UNIT(S): 5000 INJECTION INTRAVENOUS; SUBCUTANEOUS at 05:59

## 2018-01-22 RX ADMIN — DONEPEZIL HYDROCHLORIDE 10 MILLIGRAM(S): 10 TABLET, FILM COATED ORAL at 21:32

## 2018-01-22 NOTE — CHART NOTE - NSCHARTNOTEFT_GEN_A_CORE
[place] [facility] [place] Consent obtained via phone  from patient. Prior to initiation of pharm stress test, patient developed nausea and vomiting of non bloody, bilious fluid, with associated headaches. Patient explained Regadenosine may also cause nausea and vomiting; patient declining test for another day. Discussed with PA on floor, patient to be transferred back to floor.    Araseli Miner MD  Card fellow

## 2018-01-22 NOTE — PROGRESS NOTE ADULT - ATTENDING COMMENTS
agree with above NP note.  events noted  stress cancelled due to vomiting  gi eval called  ppi  echo with normaql lv fxn  no chest pain, sob  optimized and can proceed if any endoscopy planned  rakesh  hydrate

## 2018-01-23 DIAGNOSIS — R14.1 GAS PAIN: ICD-10-CM

## 2018-01-23 DIAGNOSIS — K21.9 GASTRO-ESOPHAGEAL REFLUX DISEASE WITHOUT ESOPHAGITIS: ICD-10-CM

## 2018-01-23 DIAGNOSIS — R11.2 NAUSEA WITH VOMITING, UNSPECIFIED: ICD-10-CM

## 2018-01-23 DIAGNOSIS — R69 ILLNESS, UNSPECIFIED: ICD-10-CM

## 2018-01-23 LAB
BUN SERPL-MCNC: 21 MG/DL — SIGNIFICANT CHANGE UP (ref 7–23)
CALCIUM SERPL-MCNC: 8.5 MG/DL — SIGNIFICANT CHANGE UP (ref 8.4–10.5)
CHLORIDE SERPL-SCNC: 104 MMOL/L — SIGNIFICANT CHANGE UP (ref 98–107)
CO2 SERPL-SCNC: 26 MMOL/L — SIGNIFICANT CHANGE UP (ref 22–31)
CREAT SERPL-MCNC: 1.37 MG/DL — HIGH (ref 0.5–1.3)
GLUCOSE SERPL-MCNC: 102 MG/DL — HIGH (ref 70–99)
HCT VFR BLD CALC: 40.8 % — SIGNIFICANT CHANGE UP (ref 39–50)
HGB BLD-MCNC: 13.2 G/DL — SIGNIFICANT CHANGE UP (ref 13–17)
MAGNESIUM SERPL-MCNC: 2.1 MG/DL — SIGNIFICANT CHANGE UP (ref 1.6–2.6)
MCHC RBC-ENTMCNC: 28 PG — SIGNIFICANT CHANGE UP (ref 27–34)
MCHC RBC-ENTMCNC: 32.4 % — SIGNIFICANT CHANGE UP (ref 32–36)
MCV RBC AUTO: 86.6 FL — SIGNIFICANT CHANGE UP (ref 80–100)
NRBC # FLD: 0 — SIGNIFICANT CHANGE UP
NT-PROBNP SERPL-SCNC: 164.1 PG/ML — SIGNIFICANT CHANGE UP
PLATELET # BLD AUTO: 178 K/UL — SIGNIFICANT CHANGE UP (ref 150–400)
PMV BLD: 10.9 FL — SIGNIFICANT CHANGE UP (ref 7–13)
POTASSIUM SERPL-MCNC: 4.1 MMOL/L — SIGNIFICANT CHANGE UP (ref 3.5–5.3)
POTASSIUM SERPL-SCNC: 4.1 MMOL/L — SIGNIFICANT CHANGE UP (ref 3.5–5.3)
RBC # BLD: 4.71 M/UL — SIGNIFICANT CHANGE UP (ref 4.2–5.8)
RBC # FLD: 15.3 % — HIGH (ref 10.3–14.5)
SODIUM SERPL-SCNC: 142 MMOL/L — SIGNIFICANT CHANGE UP (ref 135–145)
WBC # BLD: 4.88 K/UL — SIGNIFICANT CHANGE UP (ref 3.8–10.5)
WBC # FLD AUTO: 4.88 K/UL — SIGNIFICANT CHANGE UP (ref 3.8–10.5)

## 2018-01-23 RX ORDER — POLYETHYLENE GLYCOL 3350 17 G/17G
17 POWDER, FOR SOLUTION ORAL
Qty: 0 | Refills: 0 | Status: DISCONTINUED | OUTPATIENT
Start: 2018-01-23 | End: 2018-01-24

## 2018-01-23 RX ORDER — SENNA PLUS 8.6 MG/1
2 TABLET ORAL AT BEDTIME
Qty: 0 | Refills: 0 | Status: DISCONTINUED | OUTPATIENT
Start: 2018-01-23 | End: 2018-01-24

## 2018-01-23 RX ORDER — LANOLIN ALCOHOL/MO/W.PET/CERES
3 CREAM (GRAM) TOPICAL AT BEDTIME
Qty: 0 | Refills: 0 | Status: DISCONTINUED | OUTPATIENT
Start: 2018-01-23 | End: 2018-01-24

## 2018-01-23 RX ORDER — DOCUSATE SODIUM 100 MG
100 CAPSULE ORAL THREE TIMES A DAY
Qty: 0 | Refills: 0 | Status: DISCONTINUED | OUTPATIENT
Start: 2018-01-23 | End: 2018-01-24

## 2018-01-23 RX ORDER — SODIUM CHLORIDE 0.65 %
1 AEROSOL, SPRAY (ML) NASAL THREE TIMES A DAY
Qty: 0 | Refills: 0 | Status: DISCONTINUED | OUTPATIENT
Start: 2018-01-23 | End: 2018-01-24

## 2018-01-23 RX ORDER — METOCLOPRAMIDE HCL 10 MG
5 TABLET ORAL EVERY 6 HOURS
Qty: 0 | Refills: 0 | Status: DISCONTINUED | OUTPATIENT
Start: 2018-01-23 | End: 2018-01-24

## 2018-01-23 RX ORDER — SIMETHICONE 80 MG/1
80 TABLET, CHEWABLE ORAL EVERY 6 HOURS
Qty: 0 | Refills: 0 | Status: DISCONTINUED | OUTPATIENT
Start: 2018-01-23 | End: 2018-01-24

## 2018-01-23 RX ADMIN — Medication 100 MILLIGRAM(S): at 14:32

## 2018-01-23 RX ADMIN — Medication 200 MILLIGRAM(S): at 19:56

## 2018-01-23 RX ADMIN — LOSARTAN POTASSIUM 50 MILLIGRAM(S): 100 TABLET, FILM COATED ORAL at 05:07

## 2018-01-23 RX ADMIN — SIMETHICONE 80 MILLIGRAM(S): 80 TABLET, CHEWABLE ORAL at 11:21

## 2018-01-23 RX ADMIN — BUDESONIDE AND FORMOTEROL FUMARATE DIHYDRATE 2 PUFF(S): 160; 4.5 AEROSOL RESPIRATORY (INHALATION) at 09:27

## 2018-01-23 RX ADMIN — PANTOPRAZOLE SODIUM 40 MILLIGRAM(S): 20 TABLET, DELAYED RELEASE ORAL at 05:07

## 2018-01-23 RX ADMIN — Medication 3 MILLIGRAM(S): at 20:53

## 2018-01-23 RX ADMIN — Medication 1 DROP(S): at 09:27

## 2018-01-23 RX ADMIN — HEPARIN SODIUM 5000 UNIT(S): 5000 INJECTION INTRAVENOUS; SUBCUTANEOUS at 17:03

## 2018-01-23 RX ADMIN — TAMSULOSIN HYDROCHLORIDE 0.4 MILLIGRAM(S): 0.4 CAPSULE ORAL at 20:52

## 2018-01-23 RX ADMIN — DONEPEZIL HYDROCHLORIDE 10 MILLIGRAM(S): 10 TABLET, FILM COATED ORAL at 20:53

## 2018-01-23 RX ADMIN — Medication 25 MICROGRAM(S): at 05:07

## 2018-01-23 RX ADMIN — Medication 650 MILLIGRAM(S): at 09:26

## 2018-01-23 RX ADMIN — Medication 100 MILLIGRAM(S): at 20:52

## 2018-01-23 RX ADMIN — ATORVASTATIN CALCIUM 40 MILLIGRAM(S): 80 TABLET, FILM COATED ORAL at 20:52

## 2018-01-23 RX ADMIN — Medication 81 MILLIGRAM(S): at 09:28

## 2018-01-23 RX ADMIN — SIMETHICONE 80 MILLIGRAM(S): 80 TABLET, CHEWABLE ORAL at 17:03

## 2018-01-23 RX ADMIN — Medication 650 MILLIGRAM(S): at 10:26

## 2018-01-23 RX ADMIN — AMLODIPINE BESYLATE 2.5 MILLIGRAM(S): 2.5 TABLET ORAL at 05:07

## 2018-01-23 RX ADMIN — HEPARIN SODIUM 5000 UNIT(S): 5000 INJECTION INTRAVENOUS; SUBCUTANEOUS at 05:07

## 2018-01-23 RX ADMIN — SENNA PLUS 2 TABLET(S): 8.6 TABLET ORAL at 20:52

## 2018-01-23 RX ADMIN — BUDESONIDE AND FORMOTEROL FUMARATE DIHYDRATE 2 PUFF(S): 160; 4.5 AEROSOL RESPIRATORY (INHALATION) at 20:53

## 2018-01-23 RX ADMIN — Medication 1 MILLIGRAM(S): at 09:28

## 2018-01-23 RX ADMIN — SIMETHICONE 80 MILLIGRAM(S): 80 TABLET, CHEWABLE ORAL at 23:00

## 2018-01-23 NOTE — CONSULT NOTE ADULT - ASSESSMENT
71 y/o M with PMH of CVA, DM, HTN, HLD, BPH, Cataract, Hypothyroidism, GERD presented with SOB, nausea and vomiting which since has resolved and is with complaint of increased gas

## 2018-01-23 NOTE — CONSULT NOTE ADULT - SUBJECTIVE AND OBJECTIVE BOX
complaints  patient speaks limited english. he states that he came to the hospital for nausea and vomiting which resolved yesterday. today he feels slightly nausseous. denies vomiting. he states that he also feels sweaty with some headache/  denies abdominal pain or alteration in BM  denies fever or chills  also has some sob- unable to quantify it. denies chest pain or dizziness or palpitations.  denies swelling of the legs      Allergies    No Known Allergies    MEDICATIONS  (STANDING):  amLODIPine   Tablet 2.5 milliGRAM(s) Oral daily  aspirin enteric coated 81 milliGRAM(s) Oral daily  atorvastatin 40 milliGRAM(s) Oral at bedtime  buDESOnide 160 MICROgram(s)/formoterol 4.5 MICROgram(s) Inhaler 2 Puff(s) Inhalation two times a day  dextrose 5%. 1000 milliLiter(s) (50 mL/Hr) IV Continuous <Continuous>  dextrose 50% Injectable 12.5 Gram(s) IV Push once  dextrose 50% Injectable 25 Gram(s) IV Push once  dextrose 50% Injectable 25 Gram(s) IV Push once  donepezil 10 milliGRAM(s) Oral at bedtime  folic acid 1 milliGRAM(s) Oral daily  heparin  Injectable 5000 Unit(s) SubCutaneous every 12 hours  insulin lispro (HumaLOG) corrective regimen sliding scale   SubCutaneous three times a day before meals  insulin lispro (HumaLOG) corrective regimen sliding scale   SubCutaneous at bedtime  ketorolac 0.5% Ophthalmic Solution 1 Drop(s) Both EYES daily  levothyroxine 25 MICROGram(s) Oral daily  losartan 50 milliGRAM(s) Oral daily  pantoprazole    Tablet 40 milliGRAM(s) Oral before breakfast  prednisoLONE acetate 1% Suspension 1 Drop(s) Right EYE daily  tamsulosin 0.4 milliGRAM(s) Oral at bedtime    MEDICATIONS  (PRN):  acetaminophen   Tablet. 650 milliGRAM(s) Oral every 6 hours PRN Moderate Pain (4 - 6)  ALBUTerol    90 MICROgram(s) HFA Inhaler 2 Puff(s) Inhalation every 6 hours PRN Shortness of Breath and/or Wheezing  dextrose Gel 1 Dose(s) Oral once PRN Blood Glucose LESS THAN 70 milliGRAM(s)/deciliter  glucagon  Injectable 1 milliGRAM(s) IntraMuscular once PRN Glucose LESS THAN 70 milligrams/deciliter  simethicone 80 milliGRAM(s) Chew two times a day PRN Gas  sodium chloride 0.65% Nasal 1 Spray(s) Both Nostrils three times a day PRN Nasal Congestion      PAST MEDICAL & SURGICAL HISTORY:  GERD (gastroesophageal reflux disease)  Hypothyroidism  Cataract  BPH (benign prostatic hyperplasia)  Diabetes  Asthma  History of cataract surgery      Vital Signs Last 24 Hrs  T(C): 36.5 (2018 05:06), Max: 36.9 (2018 21:30)  T(F): 97.7 (2018 05:06), Max: 98.5 (2018 21:30)  HR: 71 (2018 05:06) (60 - 71)  BP: 118/68 (2018 05:06) (116/55 - 125/87)  BP(mean): --  RR: 16 (2018 05:06) (16 - 18)  SpO2: 98% (2018 05:06) (98% - 100%)    CAPILLARY BLOOD GLUCOSE      POCT Blood Glucose.: 112 mg/dL (2018 09:01)  POCT Blood Glucose.: 152 mg/dL (2018 21:49)  POCT Blood Glucose.: 176 mg/dL (2018 17:41)  POCT Blood Glucose.: 120 mg/dL (2018 13:12)  POCT Blood Glucose.: 135 mg/dL (2018 11:52)      physical exam  awake and alert   neck - no jvd  cvs- s1s2 present. no s3s4  rs- bilateral air entry present. no creps  pa- no g/r/d/t. bs present   ext- no edema   cns- grossly intact   ROBERTO                        13.2   4.88  )-----------( 178      ( 2018 05:30 )             40.8         142  |  104  |  21  ----------------------------<  102<H>  4.1   |  26  |  1.37<H>    Ca    8.5      2018 05:30  Mg     2.1             Urinalysis Basic - ( 2018 19:13 )    Color: PLYEL / Appearance: CLEAR / S.014 / pH: 6.0  Gluc: NEGATIVE / Ketone: NEGATIVE  / Bili: NEGATIVE / Urobili: NORMAL mg/dL   Blood: NEGATIVE / Protein: NEGATIVE mg/dL / Nitrite: NEGATIVE   Leuk Esterase: NEGATIVE / RBC: 0-2 / WBC 2-5   Sq Epi: OCC / Non Sq Epi: x / Bacteria: x      EKG-sinus bradycardia. diffuse T wave inversions    Radiology  chest xray and cat scan abdomen report reviewed    assessment and plan  patient admitted with nausea and vomiting and sob- ruled out for MI.  no eveidence of chf.  continue tele and aspirin  echo report reviewed   will follow up on stress test    possible gastritis- continue protonix. abdomen exam is benign. cat scan is negative for acute pathology    dm- fs reasonable   continue sliding scale insulin    HTN/asthma- continue current meds.
Chief Complaint:  Patient is a 72y old  Male who presents with a chief complaint of JAY with nausea/vomiting (2018 05:40)    GERD (gastroesophageal reflux disease)  Hypothyroidism  Cataract  BPH (benign prostatic hyperplasia)  Kidney injury  CVA (cerebral vascular accident)  Diabetes  Asthma  History of cataract surgery     HPI:  73 y/o M with PMH of CVA, DM, HTN, HLD, BPH, Cataract, Hypothyroidism, GERD presented with SOB, nausea and vomiting. As per the patient he developed nausea/vomiting about 10 hours ago after he had eaten some rice. Patient stated that he had multiple episodes of NBNB vomiting with associated nausea. Patient also endorsed JAY and is able to walk only few feet and has to stop multiple times to catch his breath. Patient also endorsed non-productive cough for the past few days. Patient stated that due to the vomiting he has not been eating much. Patient denied any abdominal pain and when he came to the ER his nausea and vomiting had resolved. While in the ED CT abd/pelvis: Mild colonic diverticulosis without diverticulitis. No bowel obstruction or evidence of acute inflammation. The patient reports that since admission the nausea and vomiting has resolved and he is having normal soft/formed brown stools. He is without abdominal pain. C/o of increased gas this morning and overnight. Patient denied any CP, fevers, chills, diarrhea, constipation, abdominal pain, dysuria, melena, hematochezia, recent travel, sick contact, pleuritic or positional chest pain or dysphagia/odynophagia. Pt unsure if had egd/colonoscopy in the past      No Known Allergies      acetaminophen   Tablet. 650 milliGRAM(s) Oral every 6 hours PRN  ALBUTerol    90 MICROgram(s) HFA Inhaler 2 Puff(s) Inhalation every 6 hours PRN  amLODIPine   Tablet 2.5 milliGRAM(s) Oral daily  aspirin enteric coated 81 milliGRAM(s) Oral daily  atorvastatin 40 milliGRAM(s) Oral at bedtime  buDESOnide 160 MICROgram(s)/formoterol 4.5 MICROgram(s) Inhaler 2 Puff(s) Inhalation two times a day  dextrose 5%. 1000 milliLiter(s) IV Continuous <Continuous>  dextrose 50% Injectable 12.5 Gram(s) IV Push once  dextrose 50% Injectable 25 Gram(s) IV Push once  dextrose 50% Injectable 25 Gram(s) IV Push once  dextrose Gel 1 Dose(s) Oral once PRN  docusate sodium 100 milliGRAM(s) Oral three times a day  donepezil 10 milliGRAM(s) Oral at bedtime  folic acid 1 milliGRAM(s) Oral daily  glucagon  Injectable 1 milliGRAM(s) IntraMuscular once PRN  heparin  Injectable 5000 Unit(s) SubCutaneous every 12 hours  insulin lispro (HumaLOG) corrective regimen sliding scale   SubCutaneous three times a day before meals  insulin lispro (HumaLOG) corrective regimen sliding scale   SubCutaneous at bedtime  ketorolac 0.5% Ophthalmic Solution 1 Drop(s) Both EYES daily  levothyroxine 25 MICROGram(s) Oral daily  losartan 50 milliGRAM(s) Oral daily  metoclopramide Injectable 5 milliGRAM(s) IV Push every 6 hours PRN  pantoprazole    Tablet 40 milliGRAM(s) Oral before breakfast  polyethylene glycol 3350 17 Gram(s) Oral two times a day PRN  prednisoLONE acetate 1% Suspension 1 Drop(s) Right EYE daily  senna 2 Tablet(s) Oral at bedtime  simethicone 80 milliGRAM(s) Chew every 6 hours  sodium chloride 0.65% Nasal 1 Spray(s) Both Nostrils three times a day PRN  tamsulosin 0.4 milliGRAM(s) Oral at bedtime        FAMILY HISTORY:  No pertinent family history in first degree relatives        Review of Systems:    General:  No wt loss, fevers, chills, night sweats, fatigue  Eyes:  Good vision, no reported pain  ENT:  No sore throat, pain, runny nose, dysphagia  CV:  No pain, palpitations, no lightheadedness  Resp:  No dyspnea, cough, tachypnea, wheezing  GI: as above  :  No pain, bleeding, incontinence, nocturia  Muscle:  No pain, weakness  Neuro:  No weakness, tingling, memory problems  Psych:  No fatigue, insomnia, mood problems, depression  Endocrine:  No polyuria, polydypsia, cold/heat intolerance  Heme:  No petechiae, ecchymosis, easy bruisability  Skin:  No rash, tattoos, scars, edema    Relevant Family History:   n/c    Relevant Social History: n/c      Physical Exam:    Vital Signs:  Vital Signs Last 24 Hrs  T(C): 36.5 (2018 05:06), Max: 36.9 (2018 21:30)  T(F): 97.7 (2018 05:06), Max: 98.5 (2018 21:30)  HR: 71 (2018 05:06) (60 - 71)  BP: 118/68 (2018 05:06) (116/55 - 125/87)  BP(mean): --  RR: 16 (2018 05:06) (16 - 18)  SpO2: 98% (2018 05:06) (98% - 100%)  Daily     Daily Weight in k.9 (2018 06:27)    General:  Appears stated age, well-groomed, nad  HEENT:  NC/AT,  conjunctivae clear and pink, no thyromegaly, nodules, adenopathy, no JVD  Chest:  Full & symmetric excursion, no increased effort, breath sounds clear  Cardiovascular:  Regular rhythm, S1, S2, no murmur/rub/S3/S4, no abdominal bruit, no edema  Abdomen:  Soft, non-tender, non-distended, normoactive bowel sounds,  no masses ,no hepatosplenomeagaly, no signs of chronic liver disease  Extremities:  no cyanosis, clubbing or edema  Skin:  No rash/erythema/ecchymoses/petechiae/wounds/abscess/warm/dry  Neuro/Psych:  A&Ox3 , no asterixis, no tremor, no encephalopathy    Laboratory:                            13.2   4.88  )-----------( 178      ( 2018 05:30 )             40.8     01-23    142  |  104  |  21  ----------------------------<  102<H>  4.1   |  26  |  1.37<H>    Ca    8.5      2018 05:30  Mg     2.1     01-23          Urinalysis Basic - ( 2018 19:13 )    Color: PLYEL / Appearance: CLEAR / S.014 / pH: 6.0  Gluc: NEGATIVE / Ketone: NEGATIVE  / Bili: NEGATIVE / Urobili: NORMAL mg/dL   Blood: NEGATIVE / Protein: NEGATIVE mg/dL / Nitrite: NEGATIVE   Leuk Esterase: NEGATIVE / RBC: 0-2 / WBC 2-5   Sq Epi: OCC / Non Sq Epi: x / Bacteria: x        Imaging:

## 2018-01-24 VITALS
HEART RATE: 69 BPM | RESPIRATION RATE: 17 BRPM | SYSTOLIC BLOOD PRESSURE: 109 MMHG | DIASTOLIC BLOOD PRESSURE: 69 MMHG | OXYGEN SATURATION: 98 % | TEMPERATURE: 98 F

## 2018-01-24 LAB
BUN SERPL-MCNC: 28 MG/DL — HIGH (ref 7–23)
CALCIUM SERPL-MCNC: 8.5 MG/DL — SIGNIFICANT CHANGE UP (ref 8.4–10.5)
CHLORIDE SERPL-SCNC: 96 MMOL/L — LOW (ref 98–107)
CO2 SERPL-SCNC: 25 MMOL/L — SIGNIFICANT CHANGE UP (ref 22–31)
CREAT SERPL-MCNC: 1.56 MG/DL — HIGH (ref 0.5–1.3)
GLUCOSE SERPL-MCNC: 109 MG/DL — HIGH (ref 70–99)
HCT VFR BLD CALC: 41 % — SIGNIFICANT CHANGE UP (ref 39–50)
HGB BLD-MCNC: 13.6 G/DL — SIGNIFICANT CHANGE UP (ref 13–17)
MAGNESIUM SERPL-MCNC: 2.1 MG/DL — SIGNIFICANT CHANGE UP (ref 1.6–2.6)
MCHC RBC-ENTMCNC: 29.4 PG — SIGNIFICANT CHANGE UP (ref 27–34)
MCHC RBC-ENTMCNC: 33.2 % — SIGNIFICANT CHANGE UP (ref 32–36)
MCV RBC AUTO: 88.6 FL — SIGNIFICANT CHANGE UP (ref 80–100)
NRBC # FLD: 0 — SIGNIFICANT CHANGE UP
PLATELET # BLD AUTO: 156 K/UL — SIGNIFICANT CHANGE UP (ref 150–400)
PMV BLD: 10.6 FL — SIGNIFICANT CHANGE UP (ref 7–13)
POTASSIUM SERPL-MCNC: 4.3 MMOL/L — SIGNIFICANT CHANGE UP (ref 3.5–5.3)
POTASSIUM SERPL-SCNC: 4.3 MMOL/L — SIGNIFICANT CHANGE UP (ref 3.5–5.3)
RBC # BLD: 4.63 M/UL — SIGNIFICANT CHANGE UP (ref 4.2–5.8)
RBC # FLD: 15.4 % — HIGH (ref 10.3–14.5)
SODIUM SERPL-SCNC: 136 MMOL/L — SIGNIFICANT CHANGE UP (ref 135–145)
WBC # BLD: 5.42 K/UL — SIGNIFICANT CHANGE UP (ref 3.8–10.5)
WBC # FLD AUTO: 5.42 K/UL — SIGNIFICANT CHANGE UP (ref 3.8–10.5)

## 2018-01-24 RX ORDER — ASPIRIN/CALCIUM CARB/MAGNESIUM 324 MG
1 TABLET ORAL
Qty: 0 | Refills: 0 | COMMUNITY
Start: 2018-01-24

## 2018-01-24 RX ORDER — SIMETHICONE 80 MG/1
1 TABLET, CHEWABLE ORAL
Qty: 28 | Refills: 0 | OUTPATIENT
Start: 2018-01-24 | End: 2018-01-30

## 2018-01-24 RX ORDER — RANITIDINE HYDROCHLORIDE 150 MG/1
1 TABLET, FILM COATED ORAL
Qty: 0 | Refills: 0 | COMMUNITY

## 2018-01-24 RX ORDER — DOCUSATE SODIUM 100 MG
1 CAPSULE ORAL
Qty: 0 | Refills: 0 | COMMUNITY
Start: 2018-01-24

## 2018-01-24 RX ADMIN — Medication 200 MILLIGRAM(S): at 11:40

## 2018-01-24 RX ADMIN — SIMETHICONE 80 MILLIGRAM(S): 80 TABLET, CHEWABLE ORAL at 11:41

## 2018-01-24 RX ADMIN — LOSARTAN POTASSIUM 50 MILLIGRAM(S): 100 TABLET, FILM COATED ORAL at 04:30

## 2018-01-24 RX ADMIN — HEPARIN SODIUM 5000 UNIT(S): 5000 INJECTION INTRAVENOUS; SUBCUTANEOUS at 04:31

## 2018-01-24 RX ADMIN — PANTOPRAZOLE SODIUM 40 MILLIGRAM(S): 20 TABLET, DELAYED RELEASE ORAL at 04:30

## 2018-01-24 RX ADMIN — Medication 1 DROP(S): at 11:41

## 2018-01-24 RX ADMIN — Medication 200 MILLIGRAM(S): at 04:31

## 2018-01-24 RX ADMIN — Medication 1 SPRAY(S): at 04:31

## 2018-01-24 RX ADMIN — AMLODIPINE BESYLATE 2.5 MILLIGRAM(S): 2.5 TABLET ORAL at 04:30

## 2018-01-24 RX ADMIN — BUDESONIDE AND FORMOTEROL FUMARATE DIHYDRATE 2 PUFF(S): 160; 4.5 AEROSOL RESPIRATORY (INHALATION) at 10:19

## 2018-01-24 RX ADMIN — Medication 25 MICROGRAM(S): at 04:30

## 2018-01-24 RX ADMIN — Medication 100 MILLIGRAM(S): at 04:30

## 2018-01-24 RX ADMIN — Medication 1 MILLIGRAM(S): at 11:41

## 2018-01-24 RX ADMIN — Medication 81 MILLIGRAM(S): at 11:41

## 2018-01-24 RX ADMIN — SIMETHICONE 80 MILLIGRAM(S): 80 TABLET, CHEWABLE ORAL at 04:31

## 2018-01-24 NOTE — DISCHARGE NOTE ADULT - SECONDARY DIAGNOSIS.
Chest pain, unspecified type Essential hypertension Type 2 diabetes mellitus without complication, without long-term current use of insulin Hypothyroidism

## 2018-01-24 NOTE — DISCHARGE NOTE ADULT - CARE PLAN
Principal Discharge DX:	Esophagitis  Goal:	Resolution of symptoms  Assessment and plan of treatment:	Please follow up with your primary doctor in the next 1-2 weeks. Your symptoms likely started from GERD and caused inflammation in the esophagus. Information below for Dr. Colon and you should follow up with him as an outpatient. Continue medications as prescribed to help reduce inflammation.  Secondary Diagnosis:	Chest pain, unspecified type  Assessment and plan of treatment:	Likely caused by esophagitis.  You can follow up with your private cardiologist or with Dr. Villanueva.  Secondary Diagnosis:	Essential hypertension  Assessment and plan of treatment:	Continue medications as currently prescribed. Follow up with your PMD for further management of disease. Dash diet.  Secondary Diagnosis:	Type 2 diabetes mellitus without complication, without long-term current use of insulin  Assessment and plan of treatment:	Continue diet modification. Avoid complex carbohydrates such as bread, pasta, cereal, white rice, white potatoes, etc. Avoid concentrated sugar as found in desserts, candy, soda, juice, etc. Consume a diet based on lean protein (chicken, fish) and vegetables.  Secondary Diagnosis:	Hypothyroidism  Assessment and plan of treatment:	Continue synthroid.

## 2018-01-24 NOTE — DISCHARGE NOTE ADULT - PLAN OF CARE
Resolution of symptoms Please follow up with your primary doctor in the next 1-2 weeks. Your symptoms likely started from GERD and caused inflammation in the esophagus. Information below for Dr. Colon and you should follow up with him as an outpatient. Continue medications as prescribed to help reduce inflammation. Likely caused by esophagitis.  You can follow up with your private cardiologist or with Dr. Villanueva. Continue medications as currently prescribed. Follow up with your PMD for further management of disease. Dash diet. Continue diet modification. Avoid complex carbohydrates such as bread, pasta, cereal, white rice, white potatoes, etc. Avoid concentrated sugar as found in desserts, candy, soda, juice, etc. Consume a diet based on lean protein (chicken, fish) and vegetables. Continue synthroid.

## 2018-01-24 NOTE — PROGRESS NOTE ADULT - SUBJECTIVE AND OBJECTIVE BOX
INTERVAL HPI/OVERNIGHT EVENTS:    pt reports improved gas and nausea  tolerating po intake   +bm per pt    MEDICATIONS  (STANDING):  amLODIPine   Tablet 2.5 milliGRAM(s) Oral daily  aspirin enteric coated 81 milliGRAM(s) Oral daily  atorvastatin 40 milliGRAM(s) Oral at bedtime  buDESOnide 160 MICROgram(s)/formoterol 4.5 MICROgram(s) Inhaler 2 Puff(s) Inhalation two times a day  dextrose 5%. 1000 milliLiter(s) (50 mL/Hr) IV Continuous <Continuous>  dextrose 50% Injectable 12.5 Gram(s) IV Push once  dextrose 50% Injectable 25 Gram(s) IV Push once  dextrose 50% Injectable 25 Gram(s) IV Push once  docusate sodium 100 milliGRAM(s) Oral three times a day  donepezil 10 milliGRAM(s) Oral at bedtime  folic acid 1 milliGRAM(s) Oral daily  heparin  Injectable 5000 Unit(s) SubCutaneous every 12 hours  insulin lispro (HumaLOG) corrective regimen sliding scale   SubCutaneous three times a day before meals  insulin lispro (HumaLOG) corrective regimen sliding scale   SubCutaneous at bedtime  ketorolac 0.5% Ophthalmic Solution 1 Drop(s) Both EYES daily  levothyroxine 25 MICROGram(s) Oral daily  losartan 50 milliGRAM(s) Oral daily  melatonin 3 milliGRAM(s) Oral at bedtime  pantoprazole    Tablet 40 milliGRAM(s) Oral before breakfast  prednisoLONE acetate 1% Suspension 1 Drop(s) Right EYE daily  senna 2 Tablet(s) Oral at bedtime  simethicone 80 milliGRAM(s) Chew every 6 hours  tamsulosin 0.4 milliGRAM(s) Oral at bedtime    MEDICATIONS  (PRN):  acetaminophen   Tablet. 650 milliGRAM(s) Oral every 6 hours PRN Moderate Pain (4 - 6)  ALBUTerol    90 MICROgram(s) HFA Inhaler 2 Puff(s) Inhalation every 6 hours PRN Shortness of Breath and/or Wheezing  dextrose Gel 1 Dose(s) Oral once PRN Blood Glucose LESS THAN 70 milliGRAM(s)/deciliter  glucagon  Injectable 1 milliGRAM(s) IntraMuscular once PRN Glucose LESS THAN 70 milligrams/deciliter  guaiFENesin   Syrup  (Sugar-Free) 200 milliGRAM(s) Oral every 6 hours PRN Cough  metoclopramide Injectable 5 milliGRAM(s) IV Push every 6 hours PRN nausea/vomiting  polyethylene glycol 3350 17 Gram(s) Oral two times a day PRN Constipation  sodium chloride 0.65% Nasal 1 Spray(s) Both Nostrils three times a day PRN Nasal Congestion      Allergies    No Known Allergies    Intolerances        Review of Systems:    General:  No wt loss, fevers, chills, night sweats, fatigue   Eyes:  Good vision, no reported pain  ENT:  No sore throat, pain, runny nose, dysphagia  CV:  No pain, palpitations, hypo/hypertension  Resp:  No dyspnea, cough, tachypnea, wheezing  GI:  No pain, No nausea, No vomiting, No diarrhea, No constipation, No weight loss, No fever, No pruritis, No rectal bleeding, No melena, No dysphagia  :  No pain, bleeding, incontinence, nocturia  Muscle:  No pain, weakness  Neuro:  No weakness, tingling, memory problems  Psych:  No fatigue, insomnia, mood problems, depression  Endocrine:  No polyuria, polydypsia, cold/heat intolerance  Heme:  No petechiae, ecchymosis, easy bruisability  Skin:  No rash, tattoos, scars, edema      Vital Signs Last 24 Hrs  T(C): 36.4 (24 Jan 2018 04:29), Max: 37.1 (23 Jan 2018 20:49)  T(F): 97.5 (24 Jan 2018 04:29), Max: 98.8 (23 Jan 2018 20:49)  HR: 62 (24 Jan 2018 04:29) (62 - 62)  BP: 115/60 (24 Jan 2018 04:29) (101/62 - 115/60)  BP(mean): --  RR: 17 (24 Jan 2018 04:29) (16 - 17)  SpO2: 97% (24 Jan 2018 04:29) (96% - 100%)    PHYSICAL EXAM:    Constitutional: NAD  HEENT: EOMI, throat clear  Neck: No LAD, supple  Respiratory: CTA and P  Cardiovascular: S1 and S2, RRR, no M  Gastrointestinal: BS+, soft, NT/ND, neg HSM,  Extremities: No peripheral edema, neg clubbing, cyanosis  Vascular: 2+ peripheral pulses  Neurological: A/O x 3, no focal deficits  Psychiatric: Normal mood, normal affect  Skin: No rashes      LABS:                        13.6   5.42  )-----------( 156      ( 24 Jan 2018 04:40 )             41.0     01-24    136  |  96<L>  |  28<H>  ----------------------------<  109<H>  4.3   |  25  |  1.56<H>    Ca    8.5      24 Jan 2018 04:40  Mg     2.1     01-24            RADIOLOGY & ADDITIONAL TESTS:
CARDIOLOGY FOLLOW UP - Dr. Rich FRANKS mild improvement of gas/ n/v   no chest pain or sob        PHYSICAL EXAM:  T(C): 36.7 (01-23-18 @ 13:37), Max: 36.9 (01-22-18 @ 21:30)  HR: 62 (01-23-18 @ 13:37) (60 - 71)  BP: 101/62 (01-23-18 @ 13:37) (101/62 - 125/87)  RR: 17 (01-23-18 @ 13:37) (16 - 18)  SpO2: 96% (01-23-18 @ 13:37) (96% - 100%)  Wt(kg): --  I&O's Summary    22 Jan 2018 07:01  -  23 Jan 2018 07:00  --------------------------------------------------------  IN: 547 mL / OUT: 2400 mL / NET: -1853 mL    23 Jan 2018 07:01  -  23 Jan 2018 13:53  --------------------------------------------------------  IN: 360 mL / OUT: 700 mL / NET: -340 mL        Appearance: Normal	  Cardiovascular: Normal S1 S2,RRR, No JVD, No murmurs  Respiratory: Lungs clear to auscultation	  Gastrointestinal:  Soft, Non-tender, + BS	  Extremities: Normal range of motion, No clubbing, cyanosis or edema        MEDICATIONS  (STANDING):  amLODIPine   Tablet 2.5 milliGRAM(s) Oral daily  aspirin enteric coated 81 milliGRAM(s) Oral daily  atorvastatin 40 milliGRAM(s) Oral at bedtime  buDESOnide 160 MICROgram(s)/formoterol 4.5 MICROgram(s) Inhaler 2 Puff(s) Inhalation two times a day  dextrose 5%. 1000 milliLiter(s) (50 mL/Hr) IV Continuous <Continuous>  dextrose 50% Injectable 12.5 Gram(s) IV Push once  dextrose 50% Injectable 25 Gram(s) IV Push once  dextrose 50% Injectable 25 Gram(s) IV Push once  docusate sodium 100 milliGRAM(s) Oral three times a day  donepezil 10 milliGRAM(s) Oral at bedtime  folic acid 1 milliGRAM(s) Oral daily  heparin  Injectable 5000 Unit(s) SubCutaneous every 12 hours  insulin lispro (HumaLOG) corrective regimen sliding scale   SubCutaneous three times a day before meals  insulin lispro (HumaLOG) corrective regimen sliding scale   SubCutaneous at bedtime  ketorolac 0.5% Ophthalmic Solution 1 Drop(s) Both EYES daily  levothyroxine 25 MICROGram(s) Oral daily  losartan 50 milliGRAM(s) Oral daily  pantoprazole    Tablet 40 milliGRAM(s) Oral before breakfast  prednisoLONE acetate 1% Suspension 1 Drop(s) Right EYE daily  senna 2 Tablet(s) Oral at bedtime  simethicone 80 milliGRAM(s) Chew every 6 hours  tamsulosin 0.4 milliGRAM(s) Oral at bedtime      TELEMETRY: 	NSR     ECG:  	  RADIOLOGY:   DIAGNOSTIC TESTING:  [ ] Echocardiogram:  [ ]  Catheterization:  [ ] Stress Test:    OTHER: 	    LABS:	 	                                13.2   4.88  )-----------( 178      ( 23 Jan 2018 05:30 )             40.8     01-23    142  |  104  |  21  ----------------------------<  102<H>  4.1   |  26  |  1.37<H>    Ca    8.5      23 Jan 2018 05:30  Mg     2.1     01-23
CARDIOLOGY FOLLOW UP - Dr. Villanueva    CC  no chest pain or sob   c.o abd gas/ n/v this am   events noted     PHYSICAL EXAM:  T(C): 36.4 (01-22-18 @ 04:47), Max: 36.8 (01-21-18 @ 21:42)  HR: 78 (01-22-18 @ 04:47) (63 - 78)  BP: 117/56 (01-22-18 @ 04:47) (117/56 - 129/84)  RR: 18 (01-22-18 @ 04:47) (17 - 18)  SpO2: 98% (01-22-18 @ 04:47) (97% - 98%)  Wt(kg): --  I&O's Summary    21 Jan 2018 07:01  -  22 Jan 2018 07:00  --------------------------------------------------------  IN: 307 mL / OUT: 1450 mL / NET: -1143 mL    22 Jan 2018 07:01  -  22 Jan 2018 14:03  --------------------------------------------------------  IN: 0 mL / OUT: 900 mL / NET: -900 mL        Appearance: Normal	  Cardiovascular: Normal S1 S2,RRR, No JVD, No murmurs  Respiratory: Lungs clear to auscultation	  Gastrointestinal:  Soft, Non-tender, + BS	  Extremities: Normal range of motion, No clubbing, cyanosis or edema        MEDICATIONS  (STANDING):  amLODIPine   Tablet 2.5 milliGRAM(s) Oral daily  aspirin enteric coated 81 milliGRAM(s) Oral daily  atorvastatin 40 milliGRAM(s) Oral at bedtime  buDESOnide 160 MICROgram(s)/formoterol 4.5 MICROgram(s) Inhaler 2 Puff(s) Inhalation two times a day  dextrose 5%. 1000 milliLiter(s) (50 mL/Hr) IV Continuous <Continuous>  dextrose 50% Injectable 12.5 Gram(s) IV Push once  dextrose 50% Injectable 25 Gram(s) IV Push once  dextrose 50% Injectable 25 Gram(s) IV Push once  donepezil 10 milliGRAM(s) Oral at bedtime  folic acid 1 milliGRAM(s) Oral daily  heparin  Injectable 5000 Unit(s) SubCutaneous every 12 hours  insulin lispro (HumaLOG) corrective regimen sliding scale   SubCutaneous three times a day before meals  insulin lispro (HumaLOG) corrective regimen sliding scale   SubCutaneous at bedtime  ketorolac 0.5% Ophthalmic Solution 1 Drop(s) Both EYES daily  levothyroxine 25 MICROGram(s) Oral daily  losartan 50 milliGRAM(s) Oral daily  pantoprazole    Tablet 40 milliGRAM(s) Oral before breakfast  prednisoLONE acetate 1% Suspension 1 Drop(s) Right EYE daily  tamsulosin 0.4 milliGRAM(s) Oral at bedtime      TELEMETRY: sinus bradycardia- NSR HR 50- 60s 	    ECG:  	  RADIOLOGY:   DIAGNOSTIC TESTING:  [ x] Echocardiogram:  < from: TTE with Doppler (w/Cont) (01.22.18 @ 09:08) >  CONCLUSIONS:  1. Mitral annular calcification, otherwise normal mitral  valve. Minimal mitral regurgitation.  2. Normal left ventricular internal dimensions and wall  thicknesses.  3. Endocardium not well visualized; grossly normal left  ventricular systolic function.  Endocardial visualization  enhanced with intravenous injection of echo contrast  (Definity).  4. The right ventricle is not well visualized; grossly  normal right ventricular systolic function.  ------------------------------------------------------------------------    < end of copied text >    [ ]  Catheterization:  [ ] Stress Test:    OTHER: 	  < from: CT Abdomen and Pelvis w/ Oral Cont and w/ IV Cont (01.20.18 @ 23:27) >    IMPRESSION:     Mild colonic diverticulosis without diverticulitis.    No bowel obstruction or evidence of acute inflammation.        < end of copied text >    LABS:	 	        CKMB: 5.21 ng/mL (01-21 @ 18:35)                          13.5   4.37  )-----------( 183      ( 22 Jan 2018 06:29 )             41.9     01-22    137  |  101  |  19  ----------------------------<  111<H>  4.0   |  26  |  1.51<H>    Ca    8.3<L>      22 Jan 2018 06:29  Mg     2.2     01-22    TPro  7.3  /  Alb  4.2  /  TBili  0.7  /  DBili  x   /  AST  31  /  ALT  21  /  AlkPhos  61  01-20    PT/INR - ( 20 Jan 2018 20:39 )   PT: 12.1 SEC;   INR: 1.05          PTT - ( 20 Jan 2018 20:39 )  PTT:31.3 SEC
CARDIOLOGY FOLLOW UP - Dr. Villanueva    CC no chest pain or sob    GI symptoms improving      PHYSICAL EXAM:  T(C): 36.4 (01-24-18 @ 04:29), Max: 37.1 (01-23-18 @ 20:49)  HR: 62 (01-24-18 @ 04:29) (62 - 62)  BP: 115/60 (01-24-18 @ 04:29) (101/62 - 115/60)  RR: 17 (01-24-18 @ 04:29) (16 - 17)  SpO2: 97% (01-24-18 @ 04:29) (96% - 100%)  Wt(kg): --  I&O's Summary    23 Jan 2018 07:01  -  24 Jan 2018 07:00  --------------------------------------------------------  IN: 667 mL / OUT: 1500 mL / NET: -833 mL        Appearance: Normal	  Cardiovascular: Normal S1 S2,RRR, No JVD, No murmurs  Respiratory: Lungs clear to auscultation	  Gastrointestinal:  Soft, Non-tender, + BS	  Extremities: Normal range of motion, No clubbing, cyanosis or edema        MEDICATIONS  (STANDING):  amLODIPine   Tablet 2.5 milliGRAM(s) Oral daily  aspirin enteric coated 81 milliGRAM(s) Oral daily  atorvastatin 40 milliGRAM(s) Oral at bedtime  buDESOnide 160 MICROgram(s)/formoterol 4.5 MICROgram(s) Inhaler 2 Puff(s) Inhalation two times a day  dextrose 5%. 1000 milliLiter(s) (50 mL/Hr) IV Continuous <Continuous>  dextrose 50% Injectable 12.5 Gram(s) IV Push once  dextrose 50% Injectable 25 Gram(s) IV Push once  dextrose 50% Injectable 25 Gram(s) IV Push once  docusate sodium 100 milliGRAM(s) Oral three times a day  donepezil 10 milliGRAM(s) Oral at bedtime  folic acid 1 milliGRAM(s) Oral daily  heparin  Injectable 5000 Unit(s) SubCutaneous every 12 hours  insulin lispro (HumaLOG) corrective regimen sliding scale   SubCutaneous three times a day before meals  insulin lispro (HumaLOG) corrective regimen sliding scale   SubCutaneous at bedtime  ketorolac 0.5% Ophthalmic Solution 1 Drop(s) Both EYES daily  levothyroxine 25 MICROGram(s) Oral daily  losartan 50 milliGRAM(s) Oral daily  melatonin 3 milliGRAM(s) Oral at bedtime  pantoprazole    Tablet 40 milliGRAM(s) Oral before breakfast  prednisoLONE acetate 1% Suspension 1 Drop(s) Right EYE daily  senna 2 Tablet(s) Oral at bedtime  simethicone 80 milliGRAM(s) Chew every 6 hours  tamsulosin 0.4 milliGRAM(s) Oral at bedtime      TELEMETRY: NSR 	    ECG:  	  RADIOLOGY:   DIAGNOSTIC TESTING:  [ ] Echocardiogram:  [ ]  Catheterization:  [ ] Stress Test:    OTHER: 	    LABS:	 	                                13.6   5.42  )-----------( 156      ( 24 Jan 2018 04:40 )             41.0     01-24    136  |  96<L>  |  28<H>  ----------------------------<  109<H>  4.3   |  25  |  1.56<H>    Ca    8.5      24 Jan 2018 04:40  Mg     2.1     01-24

## 2018-01-24 NOTE — DISCHARGE NOTE ADULT - HOME CARE AGENCY
[facility] [phone]- Initial RN/PT visit scheduled for 01/25/2018

## 2018-01-24 NOTE — DISCHARGE NOTE ADULT - MEDICATION SUMMARY - MEDICATIONS TO TAKE
I will START or STAY ON the medications listed below when I get home from the hospital:    aspirin 81 mg oral delayed release tablet  -- 1 tab(s) by mouth once a day  -- Indication: For Heart protection    losartan 50 mg oral tablet  -- 1 tab(s) by mouth once a day  -- Indication: For Essential hypertension    tamsulosin 0.4 mg oral capsule  -- 1 cap(s) by mouth once a day  -- Indication: For BPH (benign prostatic hyperplasia)    pioglitazone 45 mg oral tablet  -- 1 tab(s) by mouth once a day  -- Indication: For Diabetes    glipiZIDE 10 mg oral tablet  -- 1 tab(s) by mouth once a day  -- Indication: For Diabetes    atorvastatin 40 mg oral tablet  -- 1 tab(s) by mouth once a day (at bedtime)  -- Indication: For Hyperlipidemia    Ventolin HFA 90 mcg/inh inhalation aerosol  -- 2 puff(s) inhaled 4 times a day  -- Indication: For Asthma    Symbicort 160 mcg-4.5 mcg/inh inhalation aerosol  -- 2 puff(s) inhaled 2 times a day  -- Indication: For Asthma    amLODIPine 2.5 mg oral tablet  -- 1 tab(s) by mouth once a day  -- Indication: For Essential hypertension    donepezil 10 mg oral tablet  -- 1 tab(s) by mouth once a day (at bedtime)  -- Indication: For Dementia    docusate sodium 100 mg oral capsule  -- 1 cap(s) by mouth 3 times a day  -- Indication: For Constipation    simethicone 80 mg oral tablet, chewable  -- 1 tab(s) by mouth every 6 hours  -- Indication: For GERD (gastroesophageal reflux disease)    ketorolac 0.4% ophthalmic solution  -- 1 drop(s) to each affected eye once a day  -- Indication: For Eye Drop    prednisoLONE acetate 1% ophthalmic suspension  -- 1 drop(s) to each affected eye once a day  -- Indication: For Eye drop    pantoprazole 40 mg oral delayed release tablet  -- 1 tab(s) by mouth once a day  -- Indication: For GERD (gastroesophageal reflux disease)    levothyroxine 25 mcg (0.025 mg) oral tablet  -- 1 tab(s) by mouth once a day  -- Indication: For Hypothyroidism    folic acid 1 mg oral tablet  -- 1 tab(s) by mouth once a day  -- Indication: For Supplement

## 2018-01-24 NOTE — PROGRESS NOTE ADULT - ASSESSMENT
71 y/o M with PMH of CVA, DM, HTN, HLD, BPH, Cataract, Hypothyroidism, GERD presented with SOB, nausea and vomiting which since has resolved and is with complaint of increased gas
71 y/o M with PMH of CVA, DM, HTN, HLD, BPH, Cataract, Hypothyroidism, GERD presented with SOB, nausea and vomiting.    1 Abd pain  n/v, + gas   r/o ischemic heart disease  protonix/ simethicone   CT abd revealing mild colonic diverticulosis , no bowel obstruction   pending stress  r/o ischemic heart disease  GI eval  for symptoms   med f/u     2. Atypical chest pain   likely secondary to GI symptoms   event noted from yesterday   currently no chest pain or sob.  EKG unchanged from admission   echo revealing normal lv sys fx  pending Stress test   continue with low dose ASA     dvt ppx
71 y/o M with PMH of CVA, DM, HTN, HLD, BPH, Cataract, Hypothyroidism, GERD presented with SOB, nausea and vomiting.    1 Abd pain  n/v, + gas , mild improvement   CT abd revealing mild colonic diverticulosis , no bowel obstruction   GI eval noted    gi med adjusted   med f/u     2. Atypical chest pain   likely secondary to GI symptoms   currently no chest pain or sob.  EKG unchanged prior admission from admissions   echo revealing normal lv sys fx  continue with low dose ASA   no further ischemic eval     anticipate dc planning tomorrow     dvt ppx
73 y/o M with PMH of CVA, DM, HTN, HLD, BPH, Cataract, Hypothyroidism, GERD presented with SOB, nausea and vomiting.    1 Abd pain  n/v, + gas , mild improvement   CT abd revealing mild colonic diverticulosis , no bowel obstruction   GI eval noted    gi med adjusted   med f/u     2. Atypical chest pain   likely secondary to GI symptoms   currently no chest pain or sob.  EKG unchanged from admissions   echo revealing normal lv sys fx  continue with low dose ASA   no further ischemic eval     dc planning today     dvt ppx

## 2018-01-24 NOTE — DISCHARGE NOTE ADULT - CARE PROVIDER_API CALL
Kayden Colon (), Gastroenterology; Internal Medicine  [address] [place] [address]  Phone: [phone]  Fax: [phone]    Markell Vilalnueva (MD), Cardiovascular Disease; Internal Medicine; Interventional Cardiology; Nuclear Cardiology  [address]  [place] [address]  Phone: [phone]  Fax: [phone]

## 2018-01-24 NOTE — DISCHARGE NOTE ADULT - PATIENT PORTAL LINK FT
“You can access the Follow[employer] Patient Portal, offered by [facility], by registering with the following website: [email or web address]”

## 2018-01-24 NOTE — DISCHARGE NOTE ADULT - HOSPITAL COURSE
73 y/o M with PMH of CVA, DM, HTN, HLD, BPH, Cataract, Hypothyroidism, GERD presented with SOB, nausea and vomiting. As per the patient he developed nausea/vomiting about 10 hours ago after he had eaten some rice. Patient stated that he had multiple episodes of NBNB vomiting with associated nausea. Patient also endorsed JAY and is able to walk only few feet and has to stop multiple times to catch his breath. Patient also endorsed non-productive cough for the past few days. Patient stated that due to the vomiting he has not been eating much. Patient denied any abdominal pain and when he came to the ER his nausea and vomiting had resolved. Patient denied any CP, fevers, chills, diarrhea, constipation, abdominal pain, dysuria, melena, hematochezia, recent travel, sick contact, pleuritic or positional chest pain. As per ED provider note, "Son also noted that fire department was called for possible carbon monoxide exposure".     On ED admission EKG revealed Sinus bradycardia at a rate of 59, with incomplete RBBB, with TWI in lead V2-V6, I, II, III, AVF with Qtc of 469, CE x 1: Negative, Gluc: 164. CT abd/pelvis: Mild colonic diverticulosis without diverticulitis. No bowel obstruction or evidence of acute inflammation. CXR: Trace right pleural effusion. Patient received 1x dose of Aspirin 162mg in the ED. Chest XRay showed trace right pleural effusion. Echocardiogram performed showing EF 68%, minimal MR, normal LV and RV size and function. Was planned for stress test but due to nausea and vomiting was cancelled.  GI was consulted and started reglan PRN,, found have GERD with esophagitis. Stress test was then cancelled as EKG was unchanged and Echo was normal.  Patient cleared for discharge.

## 2022-04-16 NOTE — PHYSICAL THERAPY INITIAL EVALUATION ADULT - RANGE OF MOTION EXAMINATION, REHAB EVAL
bilateral upper extremity ROM was WFL (within functional limits)/bilateral lower extremity ROM was WFL (within functional limits) No

## 2022-09-07 NOTE — PHYSICAL THERAPY INITIAL EVALUATION ADULT - AMBULATION SKILLS, REHAB EVAL
Outreach attempt was made to schedule a Medicare Wellness Visit. This was the second attempt. Contact was not made, left message.   needs device/independent

## 2023-01-31 NOTE — ED PROVIDER NOTE - CPE EDP [facility] NORM
Mr. Giselle Hope is here today for anticoagulation monitoring for the diagnosis of Atrial Fibrillation. His INR goal is 2.0-3.0 and his current Coumadin dose is 7.5mg daily. Today's findings include an INR of 3.3     Considering Mr. Ibarra's past history, todays findings, and per the coumadin policy/protocol, Mr. Didier Agosto was instructed to take Coumadin as follows,  Hold coumadin 1/31/23 hthen 7.5mg daily. He was also instructed to come back in 2 weeks for an INR check. A full discussion of the nature of anticoagulants has been carried out. A full discussion of the need for frequent and regular monitoring, precise dosage adjustment and compliance was stressed. Side effects of potential bleeding were discussed and Mr. Didier Agosto was instructed to call [phone] if there are any signs of abnormal bleeding. Mr. Didier Agosto was instructed to avoid any OTC items containing aspirin or ibuprofen and prior to starting any new OTC products to consult with his physician or pharmacist to ensure no drug interactions are present. Mr. Didier Agosto was instructed to avoid any major changes in his general diet and to avoid alcohol consumption. .      Mr. Didier Agosto verbalized his understanding of all instructions and will call the office with any questions, concerns, or signs of abnormal bleeding or blood clot.
normal...

## 2024-12-23 NOTE — DIETITIAN INITIAL EVALUATION ADULT. - PROBLEM SELECTOR PLAN 8
Procedure: Pes anserinus bursa injection  Informed consent obtained. Site marked and cleaned with ChloraPrep. Ethyl Chloride spray used for local anesthesia. Using 25 gauge needle, 40 mg of methylprednisolone acetate (Depo-Medrol) mixed with 1 ml 1% Lidocaine injected in the left pes anserinus bursa without any complications.     Continue with Flomax daily